# Patient Record
Sex: MALE | Race: WHITE | Employment: OTHER | URBAN - METROPOLITAN AREA
[De-identification: names, ages, dates, MRNs, and addresses within clinical notes are randomized per-mention and may not be internally consistent; named-entity substitution may affect disease eponyms.]

---

## 2017-09-25 ENCOUNTER — HOSPITAL ENCOUNTER (OUTPATIENT)
Dept: CT IMAGING | Age: 72
Discharge: HOME | End: 2017-09-25
Attending: INTERNAL MEDICINE
Payer: MEDICARE

## 2017-09-25 DIAGNOSIS — R06.6 HICCUPS: ICD-10-CM

## 2017-09-25 LAB — CREAT BLD-MCNC: 1.6 MG/DL (ref 0.8–1.5)

## 2017-09-25 PROCEDURE — 82565 ASSAY OF CREATININE: CPT

## 2017-09-25 RX ORDER — SODIUM CHLORIDE 0.9 % (FLUSH) 0.9 %
10 SYRINGE (ML) INJECTION
Status: ACTIVE | OUTPATIENT
Start: 2017-09-25 | End: 2017-09-25

## 2017-09-28 ENCOUNTER — HOSPITAL ENCOUNTER (OUTPATIENT)
Dept: LAB | Age: 72
Discharge: HOME | End: 2017-09-28

## 2017-09-28 PROCEDURE — 88305 TISSUE EXAM BY PATHOLOGIST: CPT | Performed by: INTERNAL MEDICINE

## 2017-10-12 ENCOUNTER — HOSPITAL ENCOUNTER (OUTPATIENT)
Dept: CT IMAGING | Age: 72
Discharge: HOME | End: 2017-10-12
Attending: INTERNAL MEDICINE
Payer: MEDICARE

## 2017-10-12 VITALS — BODY MASS INDEX: 27.16 KG/M2 | WEIGHT: 194 LBS | HEIGHT: 71 IN

## 2017-10-12 LAB — CREAT BLD-MCNC: 1.7 MG/DL (ref 0.8–1.5)

## 2017-10-12 PROCEDURE — 70491 CT SOFT TISSUE NECK W/DYE: CPT

## 2017-10-12 PROCEDURE — 74011000258 HC RX REV CODE- 258: Performed by: INTERNAL MEDICINE

## 2017-10-12 PROCEDURE — 82565 ASSAY OF CREATININE: CPT

## 2017-10-12 PROCEDURE — 74011636320 HC RX REV CODE- 636/320: Performed by: INTERNAL MEDICINE

## 2017-10-12 PROCEDURE — 74011250636 HC RX REV CODE- 250/636: Performed by: INTERNAL MEDICINE

## 2017-10-12 RX ORDER — SODIUM CHLORIDE 9 MG/ML
3 INJECTION, SOLUTION INTRAVENOUS ONCE
Status: COMPLETED | OUTPATIENT
Start: 2017-10-12 | End: 2017-10-12

## 2017-10-12 RX ORDER — SODIUM CHLORIDE 9 MG/ML
1 INJECTION, SOLUTION INTRAVENOUS AS NEEDED
Status: ACTIVE | OUTPATIENT
Start: 2017-10-12 | End: 2017-10-13

## 2017-10-12 RX ORDER — SODIUM CHLORIDE 0.9 % (FLUSH) 0.9 %
10 SYRINGE (ML) INJECTION
Status: COMPLETED | OUTPATIENT
Start: 2017-10-12 | End: 2017-10-12

## 2017-10-12 RX ADMIN — SODIUM CHLORIDE 3 ML/KG/HR: 900 INJECTION, SOLUTION INTRAVENOUS at 09:45

## 2017-10-12 RX ADMIN — SODIUM CHLORIDE 100 ML: 900 INJECTION, SOLUTION INTRAVENOUS at 12:52

## 2017-10-12 RX ADMIN — Medication 10 ML: at 12:52

## 2017-10-12 RX ADMIN — IOPAMIDOL 80 ML: 755 INJECTION, SOLUTION INTRAVENOUS at 12:52

## 2017-10-12 NOTE — PROGRESS NOTES
Patient tolerated IV fluids well. Ate lunch, appetite good. Ambulated several times to bathroom to void. Instructed to force oral fluids today and notify physician with any problems. Patient verbalizes understanding. PIV removed intact site slightly swollen, good venous returns. Patient instructed to apply warm compresses several times daily for a few days. Patient discharged ambulatory in stable condition. Patient to follow up with physician.

## 2019-02-25 ENCOUNTER — HOSPITAL ENCOUNTER (INPATIENT)
Age: 74
LOS: 1 days | Discharge: HOME | DRG: 313 | End: 2019-02-27
Attending: INTERNAL MEDICINE | Admitting: INTERNAL MEDICINE
Payer: MEDICARE

## 2019-02-25 PROBLEM — I10 HTN (HYPERTENSION): Status: ACTIVE | Noted: 2019-02-25

## 2019-02-25 PROBLEM — R07.9 CHEST PAIN: Status: ACTIVE | Noted: 2019-02-25

## 2019-02-25 LAB
CK SERPL-CCNC: 109 U/L (ref 21–215)
GLUCOSE BLD STRIP.AUTO-MCNC: 149 MG/DL (ref 65–100)
TROPONIN I SERPL-MCNC: <0.02 NG/ML (ref 0.02–0.05)

## 2019-02-25 PROCEDURE — 74011250636 HC RX REV CODE- 250/636: Performed by: FAMILY MEDICINE

## 2019-02-25 PROCEDURE — 84484 ASSAY OF TROPONIN QUANT: CPT

## 2019-02-25 PROCEDURE — 36415 COLL VENOUS BLD VENIPUNCTURE: CPT

## 2019-02-25 PROCEDURE — 93005 ELECTROCARDIOGRAM TRACING: CPT | Performed by: FAMILY MEDICINE

## 2019-02-25 PROCEDURE — 82550 ASSAY OF CK (CPK): CPT

## 2019-02-25 PROCEDURE — 82962 GLUCOSE BLOOD TEST: CPT

## 2019-02-25 PROCEDURE — 99218 HC RM OBSERVATION: CPT

## 2019-02-25 RX ORDER — CARVEDILOL 12.5 MG/1
TABLET ORAL 2 TIMES DAILY WITH MEALS
COMMUNITY

## 2019-02-25 RX ORDER — NITROGLYCERIN 0.4 MG/1
0.4 TABLET SUBLINGUAL
Status: DISCONTINUED | OUTPATIENT
Start: 2019-02-25 | End: 2019-02-27 | Stop reason: HOSPADM

## 2019-02-25 RX ORDER — ACETAMINOPHEN 325 MG/1
650 TABLET ORAL
Status: DISCONTINUED | OUTPATIENT
Start: 2019-02-25 | End: 2019-02-27 | Stop reason: HOSPADM

## 2019-02-25 RX ORDER — LEVOTHYROXINE SODIUM 25 UG/1
88 TABLET ORAL
COMMUNITY

## 2019-02-25 RX ORDER — SODIUM CHLORIDE 0.9 % (FLUSH) 0.9 %
5-40 SYRINGE (ML) INJECTION EVERY 8 HOURS
Status: DISCONTINUED | OUTPATIENT
Start: 2019-02-25 | End: 2019-02-27 | Stop reason: HOSPADM

## 2019-02-25 RX ORDER — SODIUM CHLORIDE 0.9 % (FLUSH) 0.9 %
5-40 SYRINGE (ML) INJECTION AS NEEDED
Status: DISCONTINUED | OUTPATIENT
Start: 2019-02-25 | End: 2019-02-27 | Stop reason: HOSPADM

## 2019-02-25 RX ORDER — ENOXAPARIN SODIUM 100 MG/ML
40 INJECTION SUBCUTANEOUS EVERY 24 HOURS
Status: DISCONTINUED | OUTPATIENT
Start: 2019-02-25 | End: 2019-02-27 | Stop reason: HOSPADM

## 2019-02-25 RX ORDER — RANITIDINE 300 MG/1
300 TABLET ORAL 2 TIMES DAILY
COMMUNITY
End: 2019-02-27

## 2019-02-25 RX ORDER — OXYCODONE AND ACETAMINOPHEN 10; 325 MG/1; MG/1
1 TABLET ORAL
Status: DISCONTINUED | OUTPATIENT
Start: 2019-02-25 | End: 2019-02-27 | Stop reason: HOSPADM

## 2019-02-25 RX ORDER — NALOXONE HYDROCHLORIDE 0.4 MG/ML
0.4 INJECTION, SOLUTION INTRAMUSCULAR; INTRAVENOUS; SUBCUTANEOUS AS NEEDED
Status: DISCONTINUED | OUTPATIENT
Start: 2019-02-25 | End: 2019-02-27 | Stop reason: HOSPADM

## 2019-02-25 RX ORDER — MORPHINE SULFATE 2 MG/ML
2 INJECTION, SOLUTION INTRAMUSCULAR; INTRAVENOUS
Status: DISCONTINUED | OUTPATIENT
Start: 2019-02-25 | End: 2019-02-27 | Stop reason: HOSPADM

## 2019-02-25 RX ORDER — GUAIFENESIN 100 MG/5ML
81 LIQUID (ML) ORAL DAILY
Status: DISCONTINUED | OUTPATIENT
Start: 2019-02-26 | End: 2019-02-27 | Stop reason: HOSPADM

## 2019-02-25 RX ADMIN — ENOXAPARIN SODIUM 40 MG: 40 INJECTION SUBCUTANEOUS at 21:41

## 2019-02-25 RX ADMIN — Medication 10 ML: at 21:46

## 2019-02-26 ENCOUNTER — APPOINTMENT (OUTPATIENT)
Dept: GENERAL RADIOLOGY | Age: 74
DRG: 313 | End: 2019-02-26
Attending: NURSE PRACTITIONER
Payer: MEDICARE

## 2019-02-26 PROBLEM — I10 HTN (HYPERTENSION): Chronic | Status: ACTIVE | Noted: 2019-02-25

## 2019-02-26 PROBLEM — E11.9 CONTROLLED TYPE 2 DIABETES MELLITUS WITHOUT COMPLICATION, WITHOUT LONG-TERM CURRENT USE OF INSULIN (HCC): Chronic | Status: ACTIVE | Noted: 2019-02-26

## 2019-02-26 PROBLEM — K22.4 ESOPHAGEAL DYSMOTILITY: Status: ACTIVE | Noted: 2019-02-26

## 2019-02-26 LAB
ALBUMIN SERPL-MCNC: 3.1 G/DL (ref 3.2–4.6)
ALBUMIN/GLOB SERPL: 1.1 {RATIO} (ref 1.2–3.5)
ALP SERPL-CCNC: 90 U/L (ref 50–136)
ALT SERPL-CCNC: 21 U/L (ref 12–65)
ANION GAP SERPL CALC-SCNC: 9 MMOL/L (ref 7–16)
AST SERPL-CCNC: 18 U/L (ref 15–37)
ATRIAL RATE: 65 BPM
BASOPHILS # BLD: 0.1 K/UL (ref 0–0.2)
BASOPHILS NFR BLD: 1 % (ref 0–2)
BILIRUB SERPL-MCNC: 0.4 MG/DL (ref 0.2–1.1)
BUN SERPL-MCNC: 15 MG/DL (ref 8–23)
CALCIUM SERPL-MCNC: 8.4 MG/DL (ref 8.3–10.4)
CALCULATED P AXIS, ECG09: 84 DEGREES
CALCULATED R AXIS, ECG10: -79 DEGREES
CALCULATED T AXIS, ECG11: 59 DEGREES
CHLORIDE SERPL-SCNC: 107 MMOL/L (ref 98–107)
CHOLEST SERPL-MCNC: 134 MG/DL
CO2 SERPL-SCNC: 26 MMOL/L (ref 21–32)
CREAT SERPL-MCNC: 1.18 MG/DL (ref 0.8–1.5)
DIAGNOSIS, 93000: NORMAL
DIFFERENTIAL METHOD BLD: ABNORMAL
EOSINOPHIL # BLD: 0.2 K/UL (ref 0–0.8)
EOSINOPHIL NFR BLD: 2 % (ref 0.5–7.8)
ERYTHROCYTE [DISTWIDTH] IN BLOOD BY AUTOMATED COUNT: 13.8 % (ref 11.9–14.6)
GLOBULIN SER CALC-MCNC: 2.9 G/DL (ref 2.3–3.5)
GLUCOSE BLD STRIP.AUTO-MCNC: 105 MG/DL (ref 65–100)
GLUCOSE SERPL-MCNC: 116 MG/DL (ref 65–100)
HCT VFR BLD AUTO: 40.6 % (ref 41.1–50.3)
HDLC SERPL-MCNC: 40 MG/DL (ref 40–60)
HDLC SERPL: 3.4 {RATIO}
HGB BLD-MCNC: 13 G/DL (ref 13.6–17.2)
IMM GRANULOCYTES # BLD AUTO: 0 K/UL (ref 0–0.5)
IMM GRANULOCYTES NFR BLD AUTO: 0 % (ref 0–5)
LDLC SERPL CALC-MCNC: 69.4 MG/DL
LIPID PROFILE,FLP: ABNORMAL
LYMPHOCYTES # BLD: 2.2 K/UL (ref 0.5–4.6)
LYMPHOCYTES NFR BLD: 33 % (ref 13–44)
MCH RBC QN AUTO: 29.1 PG (ref 26.1–32.9)
MCHC RBC AUTO-ENTMCNC: 32 G/DL (ref 31.4–35)
MCV RBC AUTO: 91 FL (ref 79.6–97.8)
MONOCYTES # BLD: 0.7 K/UL (ref 0.1–1.3)
MONOCYTES NFR BLD: 11 % (ref 4–12)
NEUTS SEG # BLD: 3.5 K/UL (ref 1.7–8.2)
NEUTS SEG NFR BLD: 53 % (ref 43–78)
NRBC # BLD: 0 K/UL (ref 0–0.2)
P-R INTERVAL, ECG05: 158 MS
PLATELET # BLD AUTO: 139 K/UL (ref 150–450)
PMV BLD AUTO: 12.1 FL (ref 9.4–12.3)
POTASSIUM SERPL-SCNC: 3.4 MMOL/L (ref 3.5–5.1)
PROT SERPL-MCNC: 6 G/DL (ref 6.3–8.2)
Q-T INTERVAL, ECG07: 448 MS
QRS DURATION, ECG06: 164 MS
QTC CALCULATION (BEZET), ECG08: 465 MS
RBC # BLD AUTO: 4.46 M/UL (ref 4.23–5.6)
SODIUM SERPL-SCNC: 142 MMOL/L (ref 136–145)
TRIGL SERPL-MCNC: 123 MG/DL (ref 35–150)
TROPONIN I SERPL-MCNC: <0.02 NG/ML (ref 0.02–0.05)
VENTRICULAR RATE, ECG03: 65 BPM
VLDLC SERPL CALC-MCNC: 24.6 MG/DL (ref 6–23)
WBC # BLD AUTO: 6.6 K/UL (ref 4.3–11.1)

## 2019-02-26 PROCEDURE — 74011250636 HC RX REV CODE- 250/636: Performed by: INTERNAL MEDICINE

## 2019-02-26 PROCEDURE — 36415 COLL VENOUS BLD VENIPUNCTURE: CPT

## 2019-02-26 PROCEDURE — 74011250637 HC RX REV CODE- 250/637: Performed by: INTERNAL MEDICINE

## 2019-02-26 PROCEDURE — 80061 LIPID PANEL: CPT

## 2019-02-26 PROCEDURE — 84484 ASSAY OF TROPONIN QUANT: CPT

## 2019-02-26 PROCEDURE — C8929 TTE W OR WO FOL WCON,DOPPLER: HCPCS

## 2019-02-26 PROCEDURE — 80053 COMPREHEN METABOLIC PANEL: CPT

## 2019-02-26 PROCEDURE — 74011250636 HC RX REV CODE- 250/636: Performed by: FAMILY MEDICINE

## 2019-02-26 PROCEDURE — 74011250636 HC RX REV CODE- 250/636: Performed by: PHYSICIAN ASSISTANT

## 2019-02-26 PROCEDURE — 99218 HC RM OBSERVATION: CPT

## 2019-02-26 PROCEDURE — C9113 INJ PANTOPRAZOLE SODIUM, VIA: HCPCS | Performed by: PHYSICIAN ASSISTANT

## 2019-02-26 PROCEDURE — 85025 COMPLETE CBC W/AUTO DIFF WBC: CPT

## 2019-02-26 PROCEDURE — 74011250637 HC RX REV CODE- 250/637: Performed by: FAMILY MEDICINE

## 2019-02-26 PROCEDURE — 74011000250 HC RX REV CODE- 250: Performed by: PHYSICIAN ASSISTANT

## 2019-02-26 PROCEDURE — 82962 GLUCOSE BLOOD TEST: CPT

## 2019-02-26 PROCEDURE — 74011000250 HC RX REV CODE- 250: Performed by: INTERNAL MEDICINE

## 2019-02-26 RX ORDER — METOPROLOL TARTRATE 25 MG/1
25 TABLET, FILM COATED ORAL 2 TIMES DAILY
Status: DISCONTINUED | OUTPATIENT
Start: 2019-02-26 | End: 2019-02-27

## 2019-02-26 RX ORDER — METFORMIN HYDROCHLORIDE 500 MG/1
500 TABLET ORAL
COMMUNITY

## 2019-02-26 RX ADMIN — NITROGLYCERIN 0.4 MG: 0.4 TABLET, ORALLY DISINTEGRATING SUBLINGUAL at 08:57

## 2019-02-26 RX ADMIN — NITROGLYCERIN 0.4 MG: 0.4 TABLET, ORALLY DISINTEGRATING SUBLINGUAL at 08:50

## 2019-02-26 RX ADMIN — NITROGLYCERIN 1 INCH: 20 OINTMENT TOPICAL at 00:19

## 2019-02-26 RX ADMIN — ASPIRIN 81 MG 81 MG: 81 TABLET ORAL at 08:49

## 2019-02-26 RX ADMIN — SODIUM CHLORIDE 40 MG: 9 INJECTION, SOLUTION INTRAMUSCULAR; INTRAVENOUS; SUBCUTANEOUS at 10:01

## 2019-02-26 RX ADMIN — Medication 20 ML: at 21:35

## 2019-02-26 RX ADMIN — PERFLUTREN 1 ML: 6.52 INJECTION, SUSPENSION INTRAVENOUS at 14:00

## 2019-02-26 RX ADMIN — ENOXAPARIN SODIUM 40 MG: 40 INJECTION SUBCUTANEOUS at 21:31

## 2019-02-26 RX ADMIN — NITROGLYCERIN 1 INCH: 20 OINTMENT TOPICAL at 05:46

## 2019-02-26 RX ADMIN — METOPROLOL TARTRATE 25 MG: 25 TABLET ORAL at 17:22

## 2019-02-26 RX ADMIN — Medication 10 ML: at 05:47

## 2019-02-26 RX ADMIN — HYDROCHLOROTHIAZIDE: 12.5 CAPSULE ORAL at 00:19

## 2019-02-26 RX ADMIN — METOPROLOL TARTRATE 25 MG: 25 TABLET ORAL at 10:01

## 2019-02-26 RX ADMIN — SODIUM CHLORIDE 40 MG: 9 INJECTION, SOLUTION INTRAMUSCULAR; INTRAVENOUS; SUBCUTANEOUS at 21:31

## 2019-02-26 NOTE — PROGRESS NOTES
Unable to complete exam on 2/26/17 due to transport being extremely delayed. Sent for patient at 12:00pm and by [id number] the patient still had not been picked up and radiologist suggested postponing for the next morning. Tech communicated with nurse.

## 2019-02-26 NOTE — H&P
HOSPITALIST H&PNAME:  Vaishnavi Marker Age:  68 y.o. 
:   1945 MRN:   [id number] PCP: Robin Carvalho MD 
Treatment Team: Attending Provider: Jabier Castleman, DO No Order CC: Reason for admission is: chest pain HPI:  
Patient history was obtained from the urgent care provider prior to seeing the patient. Patient is a 68 y.o. male who is directly admitted to us from Urgent care facility. He went there due to acute onset of chest pain and significant HTN. reprts the pain as a pressure sensation, started out as a 8/10, now about a 4/10. He has a h/o achalasia and had his esophagus stretched a couple weeks ago. He states that he has done well with that and is off of pain meds. He reports that this pain is very different than prior esophageal pains. Denies SOB, diaphoresis, radiation of pain, fever/chills, cough, n/v, or syncopal feelings. He did check his BP at home and it was very high. He has had recent changes in his BP meds within the last few weeks, but it has never been this high before. He reports a stress test done 3-4 years ago and negative. Denies any cardiac diagnosis. His pain improved after NTG, but did not resolve completely. Pt also notes that he recently completed a fairly vigorous walk, up hills and brisk pace; did not have any symptoms, was not even winded. ROS: 
All systems have been reviewed and are negative except as stated in HPI or elsewhere. No past medical history on file. No past surgical history on file. Social History Tobacco Use  Smoking status: Not on file Substance Use Topics  Alcohol use: Not on file No family history on file. FH Reviewed and non-contributory to admitting diagnosis No Known Allergies None Objective: No intake or output data in the 24 hours ending 19 Temp (24hrs), Av.3 °F (36.8 °C), Min:98.3 °F (36.8 °C), Max:98.3 °F (36.8 °C) Oxygen Therapy O2 Sat (%): 97 % (02/25/19 2020) Body mass index is 25.31 kg/m². Patient Vitals for the past 24 hrs: 
 Temp Pulse Resp BP SpO2  
02/25/19 2020 98.3 °F (36.8 °C) 65 18 (!) 146/96 97 % Physical Exam: 
 
General:    WD and WN, No apparent distress. Head:   Normocephalic, without obvious abnormality, atraumatic. Eyes:  PERRL; EOMI; sclera normal/non-icteric ENT:  Hearing is normal.  Oropharynx is clear with tacky mucous membranes Resp:    Clear to auscultation bilaterally. No Wheezing or Rhonchi. Resp are even and unlabored Heart[de-identified]  Regular rate and rhythm,  no murmur,   No LE edema Abdomen:   Soft, non-tender. Not distended. Bowel sounds normal.  hepato-splenomegaly -none Musc/SK: Muscle strength is good and tone normal; No cyanosis. No clubbing Skin:     Texture, turgor normal. No significant rashes or lesions. Capillary refill < 2 sec Neurologic: CN II - XII are grossly intact - Eye exam as noted above Psych: Alert and oriented x 4;  Judgement and insight are normal 
  
Data Review: No results found for this or any previous visit (from the past 24 hour(s)). CXR Results  (Last 48 hours) None CT Results  (Last 48 hours) None Assessment and Plan: Active Hospital Problems Diagnosis Date Noted  Chest pain 02/25/2019  
 HTN (hypertension) 02/25/2019 Principal Problem: 
  Chest pain (2/25/2019) Serial trop; echo in am; cardiac consult to [facility] for stress test; repeat EKG Active Problems: 
  HTN (hypertension) (2/25/2019) Adjust meds; resume ACE inhibitor as pt is DM Diabetes Hold metformin; SSI · PLAN General 
·  
· Cont appropriate home meds (see MAR) · Control symptoms (pain, n/v, fever, etc) · Monitor appropriate labs · DVT prophylaxis:  Lovenox · Code status: Full;  HCPOA:  
· Risk: high · Anticipated DC needs: · Estimated LOS:  less than 2 midnights · Plans discussed with patient and/or caregiver; questions answered. Med records reviewed if applicable; findings:  
 
Critical care time if applicable:   
 
Signed By: Shaun Del Toro MD   
 February 25, 2019

## 2019-02-26 NOTE — PROGRESS NOTES
Problem: Falls - Risk of 
Goal: *Absence of Falls Document [facility] [place] Fall Risk and appropriate interventions in the flowsheet. Outcome: Progressing Towards Goal 
Fall Risk Interventions:

## 2019-02-26 NOTE — PROGRESS NOTES
Hospitalist Progress Note Admit Date:  2019  8:09 PM  
Name:  Fernando Marcum Age:  68 y.o. 
:  1945 MRN:  [id number] PCP:  Colin, Not On File Treatment Team: Attending Provider: Farrukh Rebolledo MD; Consulting Provider: Alyssa Del Cid MD; Utilization Review: Sis Casey RN; Consulting Provider: Dave Wetzel MD; Care Manager: Cami Sprague HPI/Subjective:  
69 y/o male with h/o DM, HTN and achalasia (with recent esophageal dilation) admitted  with chest pain. : He is feeling better today, but says he's had the chest pain constantly since yesterday afternoon, now currently a 3/10. It does get better with nitro but he has no SOB/JEFFERSON, palpitations, diaphoresis. Objective:  
 
Patient Vitals for the past 24 hrs: 
 Temp Pulse Resp BP SpO2  
19 0857  68  134/73   
19 0850    144/86   
19 0849 97.5 °F (36.4 °C)  18  96 % 19 0500 97.8 °F (36.6 °C) 64 18 123/77 97 % 19 0124 97.8 °F (36.6 °C) 76 18 127/70 95 % 19 2020 98.3 °F (36.8 °C) 65 18 (!) 146/96 97 % Oxygen Therapy O2 Sat (%): 96 % (19 0849) O2 Device: Room air (19 0720) No intake or output data in the 24 hours ending 19 1045 *Note that automatically entered I/Os may not be accurate; dependent on patient compliance with collection and accurate  by [employer]. General:    Well nourished. Alert. CV:   RRR. No murmur, rub, or gallop. Lungs:   CTAB. No wheezing, rhonchi, or rales. Abdomen:   Soft, nontender, nondistended. Extremities: Warm and dry. No cyanosis or edema. Skin:     No rashes or jaundice. Neuro:  No gross focal deficits Data Review: 
I have reviewed all labs, meds, and studies from the last 24 hours: 
 
Recent Results (from the past 24 hour(s)) GLUCOSE, POC Collection Time: 19  9:20 PM  
Result Value Ref Range Glucose (POC) 149 (H) 65 - 100 mg/dL CK  
 Collection Time: 02/25/19  9:59 PM  
Result Value Ref Range  21 - 215 U/L  
TROPONIN I Collection Time: 02/25/19  9:59 PM  
Result Value Ref Range Troponin-I, Qt. <0.02 (L) 0.02 - 0.05 NG/ML  
EKG, 12 LEAD, INITIAL Collection Time: 02/25/19 10:29 PM  
Result Value Ref Range Ventricular Rate 65 BPM  
 Atrial Rate 65 BPM  
 P-R Interval 158 ms QRS Duration 164 ms Q-T Interval 448 ms QTC Calculation (Bezet) 465 ms Calculated P Axis 84 degrees Calculated R Axis -79 degrees Calculated T Axis 59 degrees Diagnosis Normal sinus rhythm with sinus arrhythmia Right bundle branch block Left anterior fascicular block 
!!! Bifascicular block !!! 
Voltage criteria for left ventricular hypertrophy Cannot rule out Septal infarct , age undetermined Abnormal ECG No previous ECGs available Confirmed by MAXX STUART (), Nicky Tellez ([id number]) on 2/26/2019 7:39:06 AM 
  
LIPID PANEL Collection Time: 02/26/19  3:18 AM  
Result Value Ref Range LIPID PROFILE Cholesterol, total 134 <200 MG/DL Triglyceride 123 35 - 150 MG/DL  
 HDL Cholesterol 40 40 - 60 MG/DL  
 LDL, calculated 69.4 <100 MG/DL VLDL, calculated 24.6 (H) 6.0 - 23.0 MG/DL  
 CHOL/HDL Ratio 3.4    
CBC WITH AUTOMATED DIFF Collection Time: 02/26/19  3:18 AM  
Result Value Ref Range WBC 6.6 4.3 - 11.1 K/uL  
 RBC 4.46 4.23 - 5.6 M/uL  
 HGB 13.0 (L) 13.6 - 17.2 g/dL HCT 40.6 (L) 41.1 - 50.3 % MCV 91.0 79.6 - 97.8 FL  
 MCH 29.1 26.1 - 32.9 PG  
 MCHC 32.0 31.4 - 35.0 g/dL  
 RDW 13.8 11.9 - 14.6 % PLATELET 656 (L) 709 - 450 K/uL MPV 12.1 9.4 - 12.3 FL ABSOLUTE NRBC 0.00 0.0 - 0.2 K/uL  
 DF AUTOMATED NEUTROPHILS 53 43 - 78 % LYMPHOCYTES 33 13 - 44 % MONOCYTES 11 4.0 - 12.0 % EOSINOPHILS 2 0.5 - 7.8 % BASOPHILS 1 0.0 - 2.0 % IMMATURE GRANULOCYTES 0 0.0 - 5.0 %  
 ABS. NEUTROPHILS 3.5 1.7 - 8.2 K/UL  
 ABS. LYMPHOCYTES 2.2 0.5 - 4.6 K/UL  
 ABS. MONOCYTES 0.7 0.1 - 1.3 K/UL ABS. EOSINOPHILS 0.2 0.0 - 0.8 K/UL  
 ABS. BASOPHILS 0.1 0.0 - 0.2 K/UL  
 ABS. IMM. GRANS. 0.0 0.0 - 0.5 K/UL METABOLIC PANEL, COMPREHENSIVE Collection Time: 02/26/19  3:18 AM  
Result Value Ref Range Sodium 142 136 - 145 mmol/L Potassium 3.4 (L) 3.5 - 5.1 mmol/L Chloride 107 98 - 107 mmol/L  
 CO2 26 21 - 32 mmol/L Anion gap 9 7 - 16 mmol/L Glucose 116 (H) 65 - 100 mg/dL BUN 15 8 - 23 MG/DL Creatinine 1.18 0.8 - 1.5 MG/DL  
 GFR est AA >60 >60 ml/min/1.73m2 GFR est non-AA >60 >60 ml/min/1.73m2 Calcium 8.4 8.3 - 10.4 MG/DL Bilirubin, total 0.4 0.2 - 1.1 MG/DL  
 ALT (SGPT) 21 12 - 65 U/L  
 AST (SGOT) 18 15 - 37 U/L Alk. phosphatase 90 50 - 136 U/L Protein, total 6.0 (L) 6.3 - 8.2 g/dL Albumin 3.1 (L) 3.2 - 4.6 g/dL Globulin 2.9 2.3 - 3.5 g/dL A-G Ratio 1.1 (L) 1.2 - 3.5 All Micro Results None No results found for this visit on 02/25/19. Current Meds: 
Current Facility-Administered Medications Medication Dose Route Frequency  pantoprazole (PROTONIX) 40 mg in sodium chloride 0.9% 10 mL injection  40 mg IntraVENous Q12H  
 metoprolol tartrate (LOPRESSOR) tablet 25 mg  25 mg Oral BID  sodium chloride (NS) flush 5-40 mL  5-40 mL IntraVENous Q8H  
 sodium chloride (NS) flush 5-40 mL  5-40 mL IntraVENous PRN  
 aspirin chewable tablet 81 mg  81 mg Oral DAILY  nitroglycerin (NITROBID) 2 % ointment 1 Inch  1 Inch Topical Q6H  
 nitroglycerin (NITROSTAT) tablet 0.4 mg  0.4 mg SubLINGual Q5MIN PRN  
 morphine injection 2 mg  2 mg IntraVENous Q4H PRN  
 acetaminophen (TYLENOL) tablet 650 mg  650 mg Oral Q4H PRN  
 oxyCODONE-acetaminophen (PERCOCET 10)  mg per tablet 1 Tab  1 Tab Oral Q4H PRN  
 naloxone (NARCAN) injection 0.4 mg  0.4 mg IntraVENous PRN  
 enoxaparin (LOVENOX) injection 40 mg  40 mg SubCUTAneous Q24H  
 benazepril/hydroCHLOROthiazide (LOTENSIN HCT) 20/12.5 mg   Oral DAILY Other Studies (last 24 hours): No results found. Assessment and Plan:  
 
Hospital Problems as of 2/26/2019 Never Reviewed Codes Class Noted - Resolved POA Controlled type 2 diabetes mellitus without complication, without long-term current use of insulin (HCC) (Chronic) ICD-10-CM: E11.9 ICD-9-CM: 250.00  2/26/2019 - Present Yes Esophageal dysmotility ICD-10-CM: K22.4 ICD-9-CM: 530.5  2/26/2019 - Present Unknown * (Principal) Chest pain ICD-10-CM: R07.9 ICD-9-CM: 786.50  2/25/2019 - Present Yes HTN (hypertension) (Chronic) ICD-10-CM: I10 
ICD-9-CM: 401.9  2/25/2019 - Present Yes Plan: # Atypical chest pain - Appreciate cardiology. Initial trop neg, f/u today pending 
 - TTE pending.  
 - Cardiology to pursue stress testing if GI eval neg # Esophageal dysmotility 
 - Dilated last Thursday. Prior dilation resulted in a \"tear\", but does not sound like he had any perforation. ? Barium swallow. GI pending. # HTN 
 - controlled. Home meds. # DM 
 - metformin held. DC planning/Dispo:  As above. GI w/u, poss stress test.  
Diet:  DIET CARDIAC 
DVT ppx: lovenox Signed: 
Estrada Pete MD

## 2019-02-26 NOTE — PROGRESS NOTES
Verbal bedside report given to sade Monge RN. Patient's situation, background, assessment and recommendations provided. Opportunity for questions provided. Oncoming RN assumed care of patient.

## 2019-02-26 NOTE — CONSULTS
[facility] Cardiology Consult                Date of  Admission: 2/25/2019  8:09 PM     Primary Care Physician: Dr Villa Dupree  Primary Cardiologist: Dr Ame Bustamante   Referring Physician: Dr Marcia Hall Physician: Dr Ame Bustamante    CC/Reason for consult: CP       Bogdan Saeed is a 68 y.o. male admitted for Chest pain [R07.9]  HTN (hypertension) [I10]. He has a h/o esophageal dysmotility s/p dilation 2-21-19 at [facility], htn and DM. He had a nuclear stress test 9-2015 which was unremarkable per pt. No f/h of CAD, smoked 1 ppd x 20 years, quit 1990. He has had mild esophogeal pain s/p dilation, different from pain he began having yesterday after walking 3 miles up hills without difficulty. He was at home after walk and resting when he had sudden onset of 8/10 pressure in his L chest, radiating around to under his R arm to his back. No nausea, dizziness, palpitations,  diaphoresis or dyspnea. His SBP was elevated around 207. His BP had been elevated a few weeks ago at visit w PCP and his benazepril had been changed to coreg. Pain continued, constant, not changed with activity or position, did not try anything to help with pain, and he went to  where he was given ASA and nitro. Pain did not initially improve, but gradually decreased to 4/10 and has been constant since. Trop negative, EKG NSR w rate 65 w R BBB (old per care everywhere records), WBC 6.6, hgb 13, K 3.4, cr 1.18, LDL 69, trig 123. Pain continues 4/10. Coreg stopped and lotensin added, BP this /77. No h/o DVT or PE, no recent injury. Patient Active Problem List   Diagnosis Code    Chest pain R07.9    HTN (hypertension) I10    Controlled type 2 diabetes mellitus without complication, without long-term current use of insulin (HCC) E11.9       PMH: DM, htn     No past surgical history on file. No Known Allergies   No family history on file.    Social History     Tobacco Use    Smoking status: Not on file   Substance Use Topics    Alcohol use: Not on file        Current Facility-Administered Medications   Medication Dose Route Frequency    sodium chloride (NS) flush 5-40 mL  5-40 mL IntraVENous Q8H    sodium chloride (NS) flush 5-40 mL  5-40 mL IntraVENous PRN    aspirin chewable tablet 81 mg  81 mg Oral DAILY    nitroglycerin (NITROBID) 2 % ointment 1 Inch  1 Inch Topical Q6H    nitroglycerin (NITROSTAT) tablet 0.4 mg  0.4 mg SubLINGual Q5MIN PRN    morphine injection 2 mg  2 mg IntraVENous Q4H PRN    acetaminophen (TYLENOL) tablet 650 mg  650 mg Oral Q4H PRN    oxyCODONE-acetaminophen (PERCOCET 10)  mg per tablet 1 Tab  1 Tab Oral Q4H PRN    naloxone (NARCAN) injection 0.4 mg  0.4 mg IntraVENous PRN    enoxaparin (LOVENOX) injection 40 mg  40 mg SubCUTAneous Q24H    benazepril/hydroCHLOROthiazide (LOTENSIN HCT) 20/12.5 mg   Oral DAILY       Review of Symptoms:  General: + mild weight loss due to dysphagia,  no weakness, fever or chills  Skin: no rashes, lumps, or other skin changes  HEENT: no headache, dizziness, lightheadedness, vision changes, hearing changes, tinnitus, vertigo, sinus pressure/pain, bleeding gums, sore throat, or hoarseness  Neck: no swollen glands, goiter, pain or stiffness  Respiratory: no cough, sputum, hemoptysis, no dyspnea, wheezing  Cardiovascular: + as per HPI  Gastrointestinal: + dysphagia s/p dilation   Urinary: no frequency, urgency , hematuria, burning/pain with urination, recent flank pain, polyuria, nocturia, or difficulty urinating  Peripheral Vascular: no claudication, leg cramps, prior DVTs, swelling of calves, legs, or feet, color change, or swelling with redness or tenderness  Musculoskeletal: no muscle or joint pain/stiffness, joint swelling, erythema of joints, or back pain  Psychiatric: no depression or excessive stress  Neurological: no sensory or motor loss, seizures, syncope, tremors, numbness, no dementia  Hematologic: no anemia, easy bruising or bleeding  Endocrine: + thyroid problems on synthroid, heat or cold intolerance, excessive sweating, polyuria, polydipsia, + diabetes. Physical Exam  Vitals:    02/25/19 2020 02/26/19 0124 02/26/19 0500   BP: (!) 146/96 127/70 123/77   Pulse: 65 76 64   Resp: 18 18 18   Temp: 98.3 °F (36.8 °C) 97.8 °F (36.6 °C) 97.8 °F (36.6 °C)   SpO2: 97% 95% 97%   Weight: 82.3 kg (181 lb 8 oz)  82.1 kg (181 lb)   Height: 5' 11\" (1.803 m)         Physical Exam:  General: Well Developed, Well Nourished, No Acute Distress, appears younger than his stated age   [de-identified]: pupils equal and round, no abnormalities noted  Neck: supple, no JVD, no carotid bruits  Heart: S1S2 with RRR without murmurs or gallops  Lungs: Clear throughout auscultation bilaterally without adventitious sounds  Abd: soft, mild epigastric tenderness without rebound but guarding, says that is the 4/10 pain he continues to have   Ext: warm, no edema  Skin: warm and dry  Psychiatric: Normal mood and affect  Neurologic: Alert and oriented X 3      Cardiographics    Telemetry: NSR w rate 60-70      Labs:   Recent Labs     02/26/19  0318      K 3.4*   BUN 15   CREA 1.18   *   WBC 6.6   HGB 13.0*   HCT 40.6*   *   TRIGL 123   HDL 40        Assessment/Plan:     Assessment:   Chest pain (2/25/2019)- Unclear etiology, may be related to dilation a week ago at [facility], as atypical for angina, first troponin negative, will check a second today and echo. GI consult, if cleared by GI will consider nuke in AM if second trop negative.   Cont ASA, add PPI    HTN (hypertension) (2/25/2019)- Well controlled now on lotensin, elevated pressure may have been related to pain     Controlled type 2 diabetes mellitus without complication, without long-term current use of insulin ([facility] [address] (2/26/2019)- Per primary     Esophageal dysmotility (2/26/2019)- s/p dilation 2-21-19, add PPI      Thank you very much for this referral. We appreciate the opportunity to participate in this patient's care. We will follow along with above stated plan.     Wing Yael PA-C  Consulting MD: Rahel Waller

## 2019-02-26 NOTE — PROGRESS NOTES
[facility] Consult Note Primary GI Physician: Dr. Galileo Garcia and Dr. Ismael Centeno at [facility] 
 
Referring Provider: QUITA Irene Consult Date:  2/26/2019 Admit Date:  2/25/2019 Chief Complaint: Atypical chest pain; epigastric abdominal pain Subjective:  
 
History of Present Illness:  Patient is a 68 y.o. male with PMH of (but not limited to) DM, HTN, achalasia followed by Dr. Ismael Centeno at [facility] who is seen in consultation at the request of QUITA Barba for atypical chest pain and epigastric abdominal pain. Mr. Ananya Howard has a history of achalasia diagnosed 5 years ago at [address]. He has most recently been followed by Dr. Ismael Centeno at [facility]. He underwent pneumatic dilation last month and again most recently on Thursday 2/21/19. He had some chest pain initially, but this resolved with pain medication and he was discharged back home. He felt well yesterday and did a 3 mile walk up and down hills in [facility]. He developed epigastric abdominal pain radiating into his back following this about 3 hours later. He has had some relief with NTG. He denies any N&V, melena. He has intermittent GERD which he takes Zantac PRN for. He has dysphagia with regurgitation, chronic hiccups and weight loss with his achalasia. He is supposed to contact Dr. Dustin Matamoros office in 2 weeks to determine if he has had any relief with most recent dilation and to determine next plan of care. He denies any NSAIDS, tobacco or ETOH. He is being followed by cardiology. His Troponin is negative <0.02 on 2/26. GILBERTO is scheduled. 2/26/19 Labs: WBC 6.6, hgb 13, Hct 40.6, MCV 91, RDW 13.8, plt 139, Sodium 142, potassium 3.4, glucose 116, BUN 15, creat 1.18, albumin 3.1, T bili 0.4, ALT 21, AST 18, AP 90, Tri 123 
 
9/28/17 Colonoscopy by Dr. Galileo Garcia with fair prep; diverticular disease, 4-5mm polyp. Pathology revealed a tubular adenoma. Recommend repeat in 3 years with 2 day prep 9/28/17 EGD by Dr. Karina Arthur revealed ulcerative esophagitis, 5mm ulcer proximal of EG junction, ? Short segment Vincent's esophagus, but negative pathology. PMH: 
DM 
HTN Achalasia PSH: 
No past surgical history on file. Allergies: 
No Known Allergies Home Medications: 
Prior to Admission medications Medication Sig Start Date End Date Taking? Authorizing Provider  
metFORMIN (GLUCOPHAGE) 500 mg tablet Take 500 mg by mouth daily (with breakfast). Yes Provider, Historical  
carvedilol (COREG) 12.5 mg tablet Take  by mouth two (2) times daily (with meals). Yes Provider, Historical  
raNITIdine (ZANTAC) 300 mg tab Take 300 mg by mouth two (2) times a day. Yes Provider, Historical  
levothyroxine (SYNTHROID) 25 mcg tablet Take 25 mcg by mouth Daily (before breakfast). Yes Provider, Historical  
 
 
Hospital Medications: 
Current Facility-Administered Medications Medication Dose Route Frequency  pantoprazole (PROTONIX) 40 mg in sodium chloride 0.9% 10 mL injection  40 mg IntraVENous Q12H  
 metoprolol tartrate (LOPRESSOR) tablet 25 mg  25 mg Oral BID  sodium chloride (NS) flush 5-40 mL  5-40 mL IntraVENous Q8H  
 sodium chloride (NS) flush 5-40 mL  5-40 mL IntraVENous PRN  
 aspirin chewable tablet 81 mg  81 mg Oral DAILY  nitroglycerin (NITROBID) 2 % ointment 1 Inch  1 Inch Topical Q6H  
 nitroglycerin (NITROSTAT) tablet 0.4 mg  0.4 mg SubLINGual Q5MIN PRN  
 morphine injection 2 mg  2 mg IntraVENous Q4H PRN  
 acetaminophen (TYLENOL) tablet 650 mg  650 mg Oral Q4H PRN  
 oxyCODONE-acetaminophen (PERCOCET 10)  mg per tablet 1 Tab  1 Tab Oral Q4H PRN  
 naloxone (NARCAN) injection 0.4 mg  0.4 mg IntraVENous PRN  
 enoxaparin (LOVENOX) injection 40 mg  40 mg SubCUTAneous Q24H  
 benazepril/hydroCHLOROthiazide (LOTENSIN HCT) 20/12.5 mg   Oral DAILY Social History: 
Social History Tobacco Use  Smoking status: Former Substance Use Topics  Alcohol use: Former Family History: No family history on file. Review of Systems: A detailed 10 system ROS is obtained, with pertinent positives as listed above. All others are negative. Diet:  NPO Objective:  
 
Physical Exam: 
Vitals: 
Visit Vitals /73 Pulse 68 Temp 97.5 °F (36.4 °C) Resp 18 Ht 5' 11\" (1.803 m) Wt 82.1 kg (181 lb) SpO2 96% BMI 25.24 kg/m² Gen:  Pt is alert, cooperative, no acute distress Skin:  Extremities and face reveal no rashes. HEENT: Sclerae anicteric. Extra-occular muscles are intact. No oral ulcers. No abnormal pigmentation of the lips. The neck is supple. Cardiovascular: Regular rate and rhythm. No murmurs, gallops, or rubs. Respiratory:  Comfortable breathing with no accessory muscle use. Clear breath sounds anteriorly with no wheezes, rales, or rhonchi. GI:  Abdomen nondistended, soft, and MILD TENDERNESS EPIGASTRUM  Normal active bowel sounds. No enlargement of the liver or spleen. No masses palpable. Rectal:  Deferred Musculoskeletal:  No pitting edema of the lower legs. Neurological:  Gross memory appears intact. Patient is alert and oriented. Psychiatric:  Mood appears appropriate with judgement intact. Lymphatic:  No cervical or supraclavicular adenopathy. Laboratory:   
Recent Labs  
  02/26/19 
4273 WBC 6.6 HGB 13.0*  
HCT 40.6* * MCV 91.0   
K 3.4*  
 CO2 26 BUN 15  
CREA 1.18  
CA 8.4 * AP 90 SGOT 18 ALT 21  
TBILI 0.4 ALB 3.1* TP 6.0* Assessment:  
 
Principal Problem: 
  Chest pain (2/25/2019) Active Problems: 
  HTN (hypertension) (2/25/2019) Controlled type 2 diabetes mellitus without complication, without long-term current use of insulin ([address] (2/26/2019) Esophageal dysmotility (2/26/2019) 68 y.o. male with PMH of (but not limited to) DM, HTN, achalasia followed by Dr. Regis Cho at [facility] who is seen in consultation at the request of QUITA Barba for atypical chest pain and epigastric abdominal pain. Mr. Sasha Irwin has a history of achalasia diagnosed 5 years ago at [facility] [address] [place] [address]. He has most recently been followed by Dr. Regis Cho at [facility]. He underwent pneumatic dilation last month and again most recently on Thursday 2/21/19. He developed epigastric abdominal pain radiating into his back following exercise on 2/25. He has had some relief with NTG. His Troponin is negative <0.02 on 2/26. GILBERTO is scheduled. Plan: 1. Obtain UGI with gastrografin 2. Keep NPO until imaging 3. Continue pantoprazole 40mg bid IV 4. Lipase ATTENDING NOTE:  I have seen and examined the patient along with my NP/PA. The assessment and plan above is my own. HE HAD EGD AT [facility] 1/19 WITH 60FR CAN DILATION INDUCING A 2CM TEAR AT EGJ A RECENT EGD WITH PNEUMATIC DILATION, 30MM, WITH POST DILATION UGI WITHOUT PERFORATION 
NOT TAKING PPI REGULARLY, (HAD LA GRADE B ESOPHAGITIS ON JAN's EGD) WILL CHECK GG UGI - IF NORMAL ADVANCE DIET 
PPI BID NOW AND AFTER DISCHARGE UNTIL F/U 
F/U WITH GI AFTER DISCHARGE. I REMAIN UNCERTAIN  IF THIS IS GI OR CARDIAC PAIN. IF CARDIAC IS RULED OUT AND HE ISNT RESPONDING TO PPI's, will consider gallbladder w/u. MD Constantine Armendariz. Devan WillinghamDustin V[place] [address] [facility] Patient is seen and examined in collaboration with Dr. Inés Tay. Assessment and plan as per Dr. Inés Tay.

## 2019-02-26 NOTE — PROGRESS NOTES
SW met with pt and his spouse at bedside. He is AAOx4 and able to make needs known. He lives in a two story home with his wife. They usually use the garage entrance into the home. Demographics verified. Pt is retired. He is able to perform ADL's and ambulate without assistance. Pt still drives. No other issues at this time. SW will continue to monitor and follow up. Care Management Interventions PCP Verified by CM: Yes Mode of Transport at Discharge: Self Transition of Care Consult (CM Consult): Discharge Planning Discharge Durable Medical Equipment: No 
Physical Therapy Consult: No 
Occupational Therapy Consult: No 
Speech Therapy Consult: No 
Current Support Network: Lives with Spouse, Own Home Confirm Follow Up Transport: Family Plan discussed with Pt/Family/Caregiver: Yes Freedom of Choice Offered: Yes Discharge Location Discharge Placement: Home

## 2019-02-26 NOTE — PROGRESS NOTES
[facility]

## 2019-02-26 NOTE — PROGRESS NOTES
TRANSFER - IN REPORT: 
 
Verbal report received from MD Petey Soto on Ayla Reagan being received from MD Garner for routine progression of care. Report consisted of patients Situation, Background, Assessment and Recommendations(SBAR). Information from the following report(s) SBAR was reviewed. Opportunity for questions and clarification was provided. Assessment completed upon patients arrival to unit and care assumed. Patient received to room 334. Patient connected to monitor and assessment completed. Plan of care reviewed. Patient oriented to room and call light. Patient aware to use call light to communicate any chest pain or needs. Admission skin assessment completed with second RN and reveals the following: Skin assessment completed. Sacrum dry intact with no redness and  blanchable. Heals intact. Scar on back of neck and. Encouraged repositioning.

## 2019-02-27 ENCOUNTER — APPOINTMENT (OUTPATIENT)
Dept: NUCLEAR MEDICINE | Age: 74
DRG: 313 | End: 2019-02-27
Attending: INTERNAL MEDICINE
Payer: MEDICARE

## 2019-02-27 ENCOUNTER — APPOINTMENT (OUTPATIENT)
Dept: GENERAL RADIOLOGY | Age: 74
DRG: 313 | End: 2019-02-27
Attending: NURSE PRACTITIONER
Payer: MEDICARE

## 2019-02-27 VITALS
OXYGEN SATURATION: 97 % | HEIGHT: 71 IN | RESPIRATION RATE: 18 BRPM | HEART RATE: 97 BPM | TEMPERATURE: 98.2 F | DIASTOLIC BLOOD PRESSURE: 97 MMHG | SYSTOLIC BLOOD PRESSURE: 150 MMHG | WEIGHT: 178.3 LBS | BODY MASS INDEX: 24.96 KG/M2

## 2019-02-27 PROBLEM — I10 HYPERTENSION: Status: ACTIVE | Noted: 2019-02-27

## 2019-02-27 LAB
ALBUMIN SERPL-MCNC: 3 G/DL (ref 3.2–4.6)
ALBUMIN/GLOB SERPL: 1.1 {RATIO} (ref 1.2–3.5)
ALP SERPL-CCNC: 71 U/L (ref 50–136)
ALT SERPL-CCNC: 21 U/L (ref 12–65)
ANION GAP SERPL CALC-SCNC: 8 MMOL/L (ref 7–16)
AST SERPL-CCNC: 18 U/L (ref 15–37)
BASOPHILS # BLD: 0 K/UL (ref 0–0.2)
BASOPHILS NFR BLD: 1 % (ref 0–2)
BILIRUB SERPL-MCNC: 0.4 MG/DL (ref 0.2–1.1)
BUN SERPL-MCNC: 12 MG/DL (ref 8–23)
CALCIUM SERPL-MCNC: 8.3 MG/DL (ref 8.3–10.4)
CHLORIDE SERPL-SCNC: 111 MMOL/L (ref 98–107)
CO2 SERPL-SCNC: 25 MMOL/L (ref 21–32)
CREAT SERPL-MCNC: 1.21 MG/DL (ref 0.8–1.5)
DIFFERENTIAL METHOD BLD: ABNORMAL
EOSINOPHIL # BLD: 0.2 K/UL (ref 0–0.8)
EOSINOPHIL NFR BLD: 3 % (ref 0.5–7.8)
ERYTHROCYTE [DISTWIDTH] IN BLOOD BY AUTOMATED COUNT: 14.1 % (ref 11.9–14.6)
GLOBULIN SER CALC-MCNC: 2.7 G/DL (ref 2.3–3.5)
GLUCOSE BLD STRIP.AUTO-MCNC: 102 MG/DL (ref 65–100)
GLUCOSE SERPL-MCNC: 95 MG/DL (ref 65–100)
HCT VFR BLD AUTO: 41.4 % (ref 41.1–50.3)
HGB BLD-MCNC: 13 G/DL (ref 13.6–17.2)
IMM GRANULOCYTES # BLD AUTO: 0 K/UL (ref 0–0.5)
IMM GRANULOCYTES NFR BLD AUTO: 0 % (ref 0–5)
LYMPHOCYTES # BLD: 2.2 K/UL (ref 0.5–4.6)
LYMPHOCYTES NFR BLD: 31 % (ref 13–44)
MCH RBC QN AUTO: 29 PG (ref 26.1–32.9)
MCHC RBC AUTO-ENTMCNC: 31.4 G/DL (ref 31.4–35)
MCV RBC AUTO: 92.2 FL (ref 79.6–97.8)
MONOCYTES # BLD: 0.7 K/UL (ref 0.1–1.3)
MONOCYTES NFR BLD: 10 % (ref 4–12)
NEUTS SEG # BLD: 3.8 K/UL (ref 1.7–8.2)
NEUTS SEG NFR BLD: 55 % (ref 43–78)
NRBC # BLD: 0 K/UL (ref 0–0.2)
PLATELET # BLD AUTO: 128 K/UL (ref 150–450)
PMV BLD AUTO: 12.1 FL (ref 9.4–12.3)
POTASSIUM SERPL-SCNC: 3.5 MMOL/L (ref 3.5–5.1)
PROT SERPL-MCNC: 5.7 G/DL (ref 6.3–8.2)
RBC # BLD AUTO: 4.49 M/UL (ref 4.23–5.6)
SODIUM SERPL-SCNC: 144 MMOL/L (ref 136–145)
WBC # BLD AUTO: 6.9 K/UL (ref 4.3–11.1)

## 2019-02-27 PROCEDURE — 74240 X-RAY XM UPR GI TRC 1CNTRST: CPT

## 2019-02-27 PROCEDURE — 74011636320 HC RX REV CODE- 636/320: Performed by: INTERNAL MEDICINE

## 2019-02-27 PROCEDURE — 85025 COMPLETE CBC W/AUTO DIFF WBC: CPT

## 2019-02-27 PROCEDURE — 74011250636 HC RX REV CODE- 250/636: Performed by: PHYSICIAN ASSISTANT

## 2019-02-27 PROCEDURE — 74011250637 HC RX REV CODE- 250/637: Performed by: FAMILY MEDICINE

## 2019-02-27 PROCEDURE — 99218 HC RM OBSERVATION: CPT

## 2019-02-27 PROCEDURE — 80053 COMPREHEN METABOLIC PANEL: CPT

## 2019-02-27 PROCEDURE — 93017 CV STRESS TEST TRACING ONLY: CPT | Performed by: INTERNAL MEDICINE

## 2019-02-27 PROCEDURE — 74011000250 HC RX REV CODE- 250: Performed by: PHYSICIAN ASSISTANT

## 2019-02-27 PROCEDURE — 36415 COLL VENOUS BLD VENIPUNCTURE: CPT

## 2019-02-27 PROCEDURE — 65660000000 HC RM CCU STEPDOWN

## 2019-02-27 PROCEDURE — A9502 TC99M TETROFOSMIN: HCPCS

## 2019-02-27 PROCEDURE — C9113 INJ PANTOPRAZOLE SODIUM, VIA: HCPCS | Performed by: PHYSICIAN ASSISTANT

## 2019-02-27 PROCEDURE — 82962 GLUCOSE BLOOD TEST: CPT

## 2019-02-27 RX ORDER — PANTOPRAZOLE SODIUM 20 MG/1
20 TABLET, DELAYED RELEASE ORAL 2 TIMES DAILY
Qty: 60 TAB | Refills: 0 | Status: SHIPPED | OUTPATIENT
Start: 2019-02-27 | End: 2019-08-06

## 2019-02-27 RX ORDER — GUAIFENESIN 100 MG/5ML
81 LIQUID (ML) ORAL DAILY
Qty: 30 TAB | Refills: 0 | Status: SHIPPED | OUTPATIENT
Start: 2019-02-28 | End: 2019-08-06

## 2019-02-27 RX ORDER — SODIUM CHLORIDE 0.9 % (FLUSH) 0.9 %
10 SYRINGE (ML) INJECTION
Status: COMPLETED | OUTPATIENT
Start: 2019-02-27 | End: 2019-02-27

## 2019-02-27 RX ORDER — BENAZEPRIL HYDROCHLORIDE AND HYDROCHLOROTHIAZIDE 20; 12.5 MG/1; MG/1
1 TABLET ORAL DAILY
Qty: 30 TAB | Refills: 0 | Status: SHIPPED | OUTPATIENT
Start: 2019-02-27

## 2019-02-27 RX ADMIN — Medication 10 ML: at 09:42

## 2019-02-27 RX ADMIN — HYDROCHLOROTHIAZIDE: 12.5 CAPSULE ORAL at 08:05

## 2019-02-27 RX ADMIN — Medication 10 ML: at 08:37

## 2019-02-27 RX ADMIN — Medication 10 ML: at 05:11

## 2019-02-27 RX ADMIN — DIATRIZOATE MEGLUMINE AND DIATRIZOATE SODIUM 120 ML: 660; 100 LIQUID ORAL; RECTAL at 09:19

## 2019-02-27 RX ADMIN — ASPIRIN 81 MG 81 MG: 81 TABLET ORAL at 08:05

## 2019-02-27 RX ADMIN — SODIUM CHLORIDE 40 MG: 9 INJECTION, SOLUTION INTRAMUSCULAR; INTRAVENOUS; SUBCUTANEOUS at 08:05

## 2019-02-27 NOTE — DISCHARGE SUMMARY
Hospitalist Discharge Summary     Admit Date:  2019  8:09 PM   Name:  Rik Ryan   Age:  68 y.o.  :  1945   MRN:  [id number]   PCP:  Larry Aguero, Not On File  Treatment Team: Attending Provider: Delma Milton MD; Consulting Provider: Nathanael Loyd MD; Utilization Review: Dandre Mchugh RN; Consulting Provider: Laura Samuel MD; Care Manager: Antoni Pablo    Problem List for this Hospitalization:  Hospital Problems as of 2019 Never Reviewed          Codes Class Noted - Resolved POA    Hypertension ICD-10-CM: I10  ICD-9-CM: 401.9  2019 - Present Unknown        Controlled type 2 diabetes mellitus without complication, without long-term current use of insulin (HCC) (Chronic) ICD-10-CM: E11.9  ICD-9-CM: 250.00  2019 - Present Yes        Esophageal dysmotility ICD-10-CM: K22.4  ICD-9-CM: 530.5  2019 - Present Unknown        * (Principal) Chest pain ICD-10-CM: R07.9  ICD-9-CM: 786.50  2019 - Present Yes        HTN (hypertension) (Chronic) ICD-10-CM: I10  ICD-9-CM: 401.9  2019 - Present Yes              Admission HPI from 2019:    \"Patient is a 68 y.o. male who is directly admitted to us from Urgent care facility. He went there due to acute onset of chest pain and significant HTN. reprts the pain as a pressure sensation, started out as a 8/10, now about a 4/10. He has a h/o achalasia and had his esophagus stretched a couple weeks ago. He states that he has done well with that and is off of pain meds. He reports that this pain is very different than prior esophageal pains. Denies SOB, diaphoresis, radiation of pain, fever/chills, cough, n/v, or syncopal feelings. He did check his BP at home and it was very high. He has had recent changes in his BP meds within the last few weeks, but it has never been this high before. He reports a stress test done 3-4 years ago and negative. Denies any cardiac diagnosis.   His pain improved after NTG, but did not resolve completely. Pt also notes that he recently completed a fairly vigorous walk, up hills and brisk pace; did not have any symptoms, was not even winded. \"    Hospital Course:  As noted above pt is a 69 yo male with PMH of DM, HTN, and achalasia with recent dilation. PT presented to the ED with chest pain and hypertension. Pt reports that he got very worried as he was checking his blood pressure when the pain started. Pt also notes that he is getting established here, recently moved from [place]. Pt has an appt with his new PCP next Wednesday. Pt seen by GI and cardiology. Pt had a gastrocraffin UGI that was normal.  He also had a NM myocardial perfusion study that was negative for any acute ischemia. Pt started on an ACEI/HCTZ, will d/c on this med   Pt anxious to discharge home, discussed that he should continue to check his blood pressure and suggested that he write down the readings for his doctor to review. To follow up with GI in March. Follow up instructions and discharge meds at bottom of this note. Plan was discussed with patient and care team.  All questions answered. Patient was stable at time of discharge. 10 systems reviewed and negative except as noted in HPI. Diagnostic Imaging/Tests:   No results found.     Echocardiogram results:  Results for orders placed or performed during the hospital encounter of 19   2D ECHO COMPLETE ADULT (TTE) [address] [facility]  [address] [facility], [facility]  [phone]    Transthoracic Echocardiogram  2D, M-mode, Doppler, and Color Doppler    Patient: Estiven Cortes  MR #: [id number]  : 1945  Age: 68 years  Gender: Male  Study date: 2019  Account #: [de-identified]  Height: 71 in  Weight: 180.6 lb  BSA: 2.02 mï¾²  Status:Routine  Location: 334  BP: 123/ 77    Allergies: NO KNOWN ALLERGENS    Sonographer:  Ben Yates RDCS  Group:  [facility]  Referring Physician:  Mindy Arnold. Froylan Gaspar MD  Reading Physician:  Amalia Gómez MD    INDICATIONS: Benign Hypertension, Chest Pain    PROCEDURE: This was a routine study. A transthoracic echocardiogram was  performed. The study included complete 2D imaging, M-mode, complete spectral  Doppler, and color Doppler. Intravenous contrast (Definity, 2 ml) was  administered. Image quality was adequate. LEFT VENTRICLE: Size was normal. Systolic function was at the lower limits of  normal. Ejection fraction was estimated in the range of 50 % to 55 %. Although  no diagnostic regional wall motion abnormality was identified, this   possibility  cannot be completely excluded on the basis of this study. Wall thickness was  normal. Left ventricular diastolic function Grade 1. Average E/e' of 15. VENTRICULAR SEPTUM: Thickness was mildly increased. RIGHT VENTRICLE: The size was normal. Systolic function was normal.    LEFT ATRIUM: Size was normal.    RIGHT ATRIUM: Size was normal.    SYSTEMIC VEINS: IVC: The inferior vena cava was normal in size and course. AORTIC VALVE: The valve was structurally normal, tri-commissural. There was   no  evidence for stenosis. There was no insufficiency. MITRAL VALVE: There was mild annular calcification. Valve structure was   normal.  There was no evidence for stenosis. There was mild regurgitation. TRICUSPID VALVE: The valve structure was normal. There was no evidence for  stenosis. There was trace regurgitation. PULMONIC VALVE: Not well visualized. PERICARDIUM: There was no pericardial effusion. AORTA: The root exhibited normal size. SUMMARY:    -  Left ventricle: Systolic function was at the lower limits of normal.  Ejection fraction was estimated in the range of 50 % to 55 %. Although no  diagnostic regional wall motion abnormality was identified, this possibility  cannot be completely excluded on the basis of this study. -  Mitral valve:  There was mild annular calcification. There was mild  regurgitation. SYSTEM MEASUREMENT TABLES    2D mode  AoR Diam (2D): 3.2 cm  LA Dimension (2D): 3.8 cm  Left Atrium Systolic Volume Index; Method of Disks, Biplane; 2D mode;: 30.7  ml/m2  IVS/LVPW (2D): 0.9  IVSd (2D): 1.1 cm  LVIDd (2D): 5 cm  LVIDs (2D): 2.8 cm  LVPWd (2D): 1.3 cm  RVIDd (2D): 3.3 cm    Unspecified Scan Mode  Peak Grad; Mean; Antegrade Flow: 12 mm[Hg]  Vmax;  Antegrade Flow: 170 cm/s    Prepared and signed by    Neo Tom MD  Signed 26-Feb-2019 17:31:10           All Micro Results     None          Labs: Results:       BMP, Mg, Phos Recent Labs     02/27/19 0334 02/26/19 0318    142   K 3.5 3.4*   * 107   CO2 25 26   AGAP 8 9   BUN 12 15   CREA 1.21 1.18   CA 8.3 8.4   GLU 95 116*      CBC Recent Labs     02/27/19 0334 02/26/19 0318   WBC 6.9 6.6   RBC 4.49 4.46   HGB 13.0* 13.0*   HCT 41.4 40.6*   * 139*   GRANS 55 53   LYMPH 31 33   EOS 3 2   MONOS 10 11   BASOS 1 1   IG 0 0   ANEU 3.8 3.5   ABL 2.2 2.2   ANY 0.2 0.2   ABM 0.7 0.7   ABB 0.0 0.1   AIG 0.0 0.0      LFT Recent Labs     02/27/19 0334 02/26/19 0318   SGOT 18 18   ALT 21 21   AP 71 90   TP 5.7* 6.0*   ALB 3.0* 3.1*   GLOB 2.7 2.9   AGRAT 1.1* 1.1*      Cardiac Testing Lab Results   Component Value Date/Time     02/25/2019 09:59 PM    Troponin-I, Qt. <0.02 (L) 02/26/2019 09:54 AM    Troponin-I, Qt. <0.02 (L) 02/25/2019 09:59 PM      Coagulation Tests No results found for: PTP, INR, APTT   A1c No results found for: HBA1C, HGBE8, XJB7GTRR   Lipid Panel Lab Results   Component Value Date/Time    Cholesterol, total 134 02/26/2019 03:18 AM    HDL Cholesterol 40 02/26/2019 03:18 AM    LDL, calculated 69.4 02/26/2019 03:18 AM    VLDL, calculated 24.6 (H) 02/26/2019 03:18 AM    Triglyceride 123 02/26/2019 03:18 AM    CHOL/HDL Ratio 3.4 02/26/2019 03:18 AM      Thyroid Panel No results found for: TSH, T4, FT4, TT3, T3U, TSHEXT     Most Recent UA No results found for: COLOR, APPRN, REFSG, CARLTON, PROTU, GLUCU, KETU, BILU, BLDU, UROU, CHRIS, LEUKU     No Known Allergies    There is no immunization history on file for this patient. All Labs from Last 24 Hrs:  Recent Results (from the past 24 hour(s))   GLUCOSE, POC    Collection Time: 02/26/19 10:09 PM   Result Value Ref Range    Glucose (POC) 105 (H) 65 - 100 mg/dL   CBC WITH AUTOMATED DIFF    Collection Time: 02/27/19  3:34 AM   Result Value Ref Range    WBC 6.9 4.3 - 11.1 K/uL    RBC 4.49 4.23 - 5.6 M/uL    HGB 13.0 (L) 13.6 - 17.2 g/dL    HCT 41.4 41.1 - 50.3 %    MCV 92.2 79.6 - 97.8 FL    MCH 29.0 26.1 - 32.9 PG    MCHC 31.4 31.4 - 35.0 g/dL    RDW 14.1 11.9 - 14.6 %    PLATELET 186 (L) 043 - 450 K/uL    MPV 12.1 9.4 - 12.3 FL    ABSOLUTE NRBC 0.00 0.0 - 0.2 K/uL    DF AUTOMATED      NEUTROPHILS 55 43 - 78 %    LYMPHOCYTES 31 13 - 44 %    MONOCYTES 10 4.0 - 12.0 %    EOSINOPHILS 3 0.5 - 7.8 %    BASOPHILS 1 0.0 - 2.0 %    IMMATURE GRANULOCYTES 0 0.0 - 5.0 %    ABS. NEUTROPHILS 3.8 1.7 - 8.2 K/UL    ABS. LYMPHOCYTES 2.2 0.5 - 4.6 K/UL    ABS. MONOCYTES 0.7 0.1 - 1.3 K/UL    ABS. EOSINOPHILS 0.2 0.0 - 0.8 K/UL    ABS. BASOPHILS 0.0 0.0 - 0.2 K/UL    ABS. IMM. GRANS. 0.0 0.0 - 0.5 K/UL   METABOLIC PANEL, COMPREHENSIVE    Collection Time: 02/27/19  3:34 AM   Result Value Ref Range    Sodium 144 136 - 145 mmol/L    Potassium 3.5 3.5 - 5.1 mmol/L    Chloride 111 (H) 98 - 107 mmol/L    CO2 25 21 - 32 mmol/L    Anion gap 8 7 - 16 mmol/L    Glucose 95 65 - 100 mg/dL    BUN 12 8 - 23 MG/DL    Creatinine 1.21 0.8 - 1.5 MG/DL    GFR est AA >60 >60 ml/min/1.73m2    GFR est non-AA >60 >60 ml/min/1.73m2    Calcium 8.3 8.3 - 10.4 MG/DL    Bilirubin, total 0.4 0.2 - 1.1 MG/DL    ALT (SGPT) 21 12 - 65 U/L    AST (SGOT) 18 15 - 37 U/L    Alk.  phosphatase 71 50 - 136 U/L    Protein, total 5.7 (L) 6.3 - 8.2 g/dL    Albumin 3.0 (L) 3.2 - 4.6 g/dL    Globulin 2.7 2.3 - 3.5 g/dL    A-G Ratio 1.1 (L) 1.2 - 3.5     GLUCOSE, POC    Collection Time: 02/27/19  6:02 AM   Result Value Ref Range    Glucose (POC) 102 (H) 65 - 100 mg/dL       Discharge Exam:  Patient Vitals for the past 24 hrs:   Temp Pulse Resp BP SpO2   02/27/19 1352 98.2 °F (36.8 °C) 97 18 (!) 150/97 97 %   02/27/19 0803 98.1 °F (36.7 °C) (!) 53 18 (!) 172/97 98 %   02/27/19 0504 97.7 °F (36.5 °C) (!) 56 18 158/78 97 %   02/27/19 0050 97.8 °F (36.6 °C) (!) 50 18 143/74 97 %   02/26/19 2122 97.9 °F (36.6 °C) (!) 55 18 149/73 96 %   02/26/19 1631 97.9 °F (36.6 °C) (!) 55 18 167/83 96 %     Oxygen Therapy  O2 Sat (%): 97 % (02/27/19 1352)  Pulse via Oximetry: 97 beats per minute (02/27/19 1352)  O2 Device: Room air (02/27/19 0720)    Intake/Output Summary (Last 24 hours) at 2/27/2019 1511  Last data filed at 2/27/2019 1354  Gross per 24 hour   Intake 720 ml   Output 550 ml   Net 170 ml         Physical exam:  General:    Well nourished. Alert. No distress. Eyes:   Normal sclera. Extraocular movements intact. ENT:  Normocephalic, atraumatic. Moist mucous membranes  CV:   Regular rate and rhythm. No murmur, rub, or gallop. Lungs:  Clear to auscultation bilaterally. No wheezing, rhonchi, or rales. Abdomen: Soft, nontender, nondistended. Bowel sounds normal.   Extremities: Warm and dry. No cyanosis or edema. Neurologic: No focal deficits  Skin:     No rashes or jaundice. Psych:  Normal mood and affect. Discharge Info:   Current Discharge Medication List      START taking these medications    Details   aspirin 81 mg chewable tablet Take 1 Tab by mouth daily. Qty: 30 Tab, Refills: 0      benazepril-hydroCHLOROthiazide (LOTENSIN HCT) 20-12.5 mg per tablet Take 1 Tab by mouth daily. Qty: 30 Tab, Refills: 0      pantoprazole (PROTONIX) 20 mg tablet Take 1 Tab by mouth two (2) times a day. Qty: 60 Tab, Refills: 0         CONTINUE these medications which have NOT CHANGED    Details   metFORMIN (GLUCOPHAGE) 500 mg tablet Take 500 mg by mouth daily (with breakfast).       carvedilol (COREG) 12.5 mg tablet Take  by mouth two (2) times daily (with meals). levothyroxine (SYNTHROID) 25 mcg tablet Take 25 mcg by mouth Daily (before breakfast). STOP taking these medications       raNITIdine (ZANTAC) 300 mg tab Comments:   Reason for Stopping:                 Disposition: home    Activity: Activity as tolerated  Diet: DIET CARDIAC Regular    Follow-up Appointments   Procedures    FOLLOW UP VISIT Appointment in: One Week See new PCP as scheduled     See new PCP as scheduled     Standing Status:   Standing     Number of Occurrences:   1     Order Specific Question:   Appointment in     Answer: One Week         Follow-up Information     Follow up With Specialties Details Why Contact Info    Jean Gaytan MD Gastroenterology Go on 3/27/2019 Hospital follow up at 1:30pm [address] [facility] [address]  [phone]      Bsi, Not On File    Not On File (58) Patient has a PCP but that physician is not listed in [facility]. Case reviewed with supervising physician - WILFREDO Barney MD    Time spent in patient discharge planning and coordination 35 minutes.     Signed:  Kay Bauer NP-C

## 2019-02-27 NOTE — PROCEDURES
[facility] MED CARDIAC STRESS    Name:  Kaelyn Martínez  MR#:  [id number]  :  1945  ACCOUNT #:  [de-identified]  DATE OF SERVICE:  2019      PROCEDURE:  Exercise Cardiolite. INDICATION:  Recurrent chest pain in a patient with abnormal EKG. Exercise Cardiolite for evaluation for ischemia. TECHNIQUE AND FINDINGS:  After informed consent was obtained, the patient was brought to the nuclear medicine suite. He was initially injected with 10.34 mCi of technetium 99m tetrofosmin followed by rest imaging via standard protocol. The patient subsequently underwent a stress testing via a standard exercise Otto protocol. At peak stress, he was injected with 31.5 mCi of technetium 99m tetrofosmin followed by stress imaging via standard protocol. FINDINGS:  EXECISE EK.  Baseline EKG shows sinus rhythm. Right bundle branch block with left anterior fascicular block. Had nonspecific T-wave abnormalities in the inferior leads. 2.  The patient exercised according to Otto protocol for 9 minutes. He achieved a maximum workload of 10.1 METs. Resting heart rate of 56 beats per minute tin to a maximum heart rate of 123 beats per minute. This value represents 83% of maximal age-predicated heart rate. The resting blood pressure 170/78, tin to a maximum blood pressure of 220/80.  3.  With exercise, there were no significant changes in his ST or T-wave segments suggestive of ischemia. No ST depression was noted. Persistent right bundle branch block with left anterior fascicular block was noted. CONCLUSIONS:  1.  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 The patient has a known history of chronically abnormal EKG. 2.  No evidence of visible ischemia. He had a good systemic workload of 10.1 METs. 3.  No exercise-induced arrhythmias. 4.  Please see Cardiolite images. CARDIOLITE IMAGES:  1.   Resting images showed moderately decreased uptake in the inferior segments with normal perfusion to other myocardial segments. 2.  Stress images showed no significant change. 3.  This is confirmed by quantitative analysis. 4.  Gated wall motion analysis showed normal left ventricular systolic function with normal wall motion. There was no wall motion of the inferior wall. Ejection fraction was 51%. CONCLUSIONS:  1. Fixed inferior wall defects with normal wall motion and no evidence of reversible ischemia. I suspect this is diaphragmatic attenuation. 2.  Normal left ventricular systolic function. 3.   Low risk stress test.      MD MALIA Dukes/[email or web address]/  D:  02/27/2019 14:19  T:  02/27/2019 14:45  JOB #:  [id number]  CC:

## 2019-02-27 NOTE — PROGRESS NOTES
GI/Dr. Dalal Erie Attempted to see patient, he is off the floor for nuclear medicine study- will re-attempt to see later today. Mane Humphrey PA-C Gastroenterology Associate

## 2019-02-27 NOTE — PROGRESS NOTES
Problem: Falls - Risk of 
Goal: *Absence of Falls Document Loly Latonya Fall Risk and appropriate interventions in the flowsheet. Outcome: Progressing Towards Goal 
Fall Risk Interventions: 
  
 
  
 
Medication Interventions: Evaluate medications/consider consulting pharmacy, Patient to call before getting OOB, Teach patient to arise slowly

## 2019-02-27 NOTE — DISCHARGE INSTRUCTIONS
Patient Education     Chest Pain: Care Instructions  Your Care Instructions    There are many things that can cause chest pain. Some are not serious and will get better on their own in a few days. But some kinds of chest pain need more testing and treatment. Your doctor may have recommended a follow-up visit in the next 8 to 12 hours. If you are not getting better, you may need more tests or treatment. Even though your doctor has released you, you still need to watch for any problems. The doctor carefully checked you, but sometimes problems can develop later. If you have new symptoms or if your symptoms do not get better, get medical care right away. If you have worse or different chest pain or pressure that lasts more than 5 minutes or you passed out (lost consciousness), call 911 or seek other emergency help right away. A medical visit is only one step in your treatment. Even if you feel better, you still need to do what your doctor recommends, such as going to all suggested follow-up appointments and taking medicines exactly as directed. This will help you recover and help prevent future problems. How can you care for yourself at home? · Rest until you feel better. · Take your medicine exactly as prescribed. Call your doctor if you think you are having a problem with your medicine. · Do not drive after taking a prescription pain medicine. When should you call for help? Call 911 if:    · You passed out (lost consciousness).     · You have severe difficulty breathing.     · You have symptoms of a heart attack. These may include:  ? Chest pain or pressure, or a strange feeling in your chest.  ? Sweating. ? Shortness of breath. ? Nausea or vomiting. ? Pain, pressure, or a strange feeling in your back, neck, jaw, or upper belly or in one or both shoulders or arms. ? Lightheadedness or sudden weakness. ? A fast or irregular heartbeat.   After you call 911, the  may tell you to chew 1 adult-strength or 2 to 4 low-dose aspirin. Wait for an ambulance. Do not try to drive yourself.    Call your doctor today if:    · You have any trouble breathing.     · Your chest pain gets worse.     · You are dizzy or lightheaded, or you feel like you may faint.     · You are not getting better as expected.     · You are having new or different chest pain. Where can you learn more? Go to http://alpa-anoop.info/. Enter A120 in the search box to learn more about \"Chest Pain: Care Instructions. \"  Current as of: September 23, 2018  Content Version: 11.9  © 7380-5575 [employer]. Care instructions adapted under license by [employer] (which disclaims liability or warranty for this information). If you have questions about a medical condition or this instruction, always ask your healthcare professional. [place] [address] any warranty or liability for your use of this information. Patient Education        High Blood Pressure: Care Instructions  Overview    It's normal for blood pressure to go up and down throughout the day. But if it stays up, you have high blood pressure. Another name for high blood pressure is hypertension. Despite what a lot of people think, high blood pressure usually doesn't cause headaches or make you feel dizzy or lightheaded. It usually has no symptoms. But it does increase your risk of stroke, heart attack, and other problems. You and your doctor will talk about your risks of these problems based on your blood pressure. Your doctor will give you a goal for your blood pressure. Your goal will be based on your health and your age. Lifestyle changes, such as eating healthy and being active, are always important to help lower blood pressure. You might also take medicine to reach your blood pressure goal.  Follow-up care is a key part of your treatment and safety.  Be sure to make and go to all appointments, and call your doctor if you are having problems. It's also a good idea to know your test results and keep a list of the medicines you take. How can you care for yourself at home? Medical treatment  · If you stop taking your medicine, your blood pressure will go back up. You may take one or more types of medicine to lower your blood pressure. Be safe with medicines. Take your medicine exactly as prescribed. Call your doctor if you think you are having a problem with your medicine. · Talk to your doctor before you start taking aspirin every day. Aspirin can help certain people lower their risk of a heart attack or stroke. But taking aspirin isn't right for everyone, because it can cause serious bleeding. · See your doctor regularly. You may need to see the doctor more often at first or until your blood pressure comes down. · If you are taking blood pressure medicine, talk to your doctor before you take decongestants or anti-inflammatory medicine, such as ibuprofen. Some of these medicines can raise blood pressure. · Learn how to check your blood pressure at home. Lifestyle changes  · Stay at a healthy weight. This is especially important if you put on weight around the waist. Losing even 10 pounds can help you lower your blood pressure. · If your doctor recommends it, get more exercise. Walking is a good choice. Bit by bit, increase the amount you walk every day. Try for at least 30 minutes on most days of the week. You also may want to swim, bike, or do other activities. · Avoid or limit alcohol. Talk to your doctor about whether you can drink any alcohol. · Try to limit how much sodium you eat to less than 2,300 milligrams (mg) a day. Your doctor may ask you to try to eat less than 1,500 mg a day. · Eat plenty of fruits (such as bananas and oranges), vegetables, legumes, whole grains, and low-fat dairy products. · Lower the amount of saturated fat in your diet.  Saturated fat is found in animal products such as milk, cheese, and meat. Limiting these foods may help you lose weight and also lower your risk for heart disease. · Do not smoke. Smoking increases your risk for heart attack and stroke. If you need help quitting, talk to your doctor about stop-smoking programs and medicines. These can increase your chances of quitting for good. When should you call for help? Call 911 anytime you think you may need emergency care. This may mean having symptoms that suggest that your blood pressure is causing a serious heart or blood vessel problem. Your blood pressure may be over 180/120.   For example, call 911 if:    · You have symptoms of a heart attack. These may include:  ? Chest pain or pressure, or a strange feeling in the chest.  ? Sweating. ? Shortness of breath. ? Nausea or vomiting. ? Pain, pressure, or a strange feeling in the back, neck, jaw, or upper belly or in one or both shoulders or arms. ? Lightheadedness or sudden weakness. ? A fast or irregular heartbeat.     · You have symptoms of a stroke. These may include:  ? Sudden numbness, tingling, weakness, or loss of movement in your face, arm, or leg, especially on only one side of your body. ? Sudden vision changes. ? Sudden trouble speaking. ? Sudden confusion or trouble understanding simple statements. ? Sudden problems with walking or balance. ? A sudden, severe headache that is different from past headaches.     · You have severe back or belly pain.    Do not wait until your blood pressure comes down on its own. Get help right away.   Call your doctor now or seek immediate care if:    · Your blood pressure is much higher than normal (such as 180/120 or higher), but you don't have symptoms.     · You think high blood pressure is causing symptoms, such as:  ? Severe headache.  ? Blurry vision.    Watch closely for changes in your health, and be sure to contact your doctor if:    · Your blood pressure measures higher than your doctor recommends at least 2 times.  That means the top number is higher or the bottom number is higher, or both.     · You think you may be having side effects from your blood pressure medicine. Where can you learn more? Go to http://alpa-anoop.info/. Enter Z071 in the search box to learn more about \"High Blood Pressure: Care Instructions. \"  Current as of: July 22, 2018  Content Version: 11.9  © 4279-2328 [employer]. Care instructions adapted under license by [employer] (which disclaims liability or warranty for this information). If you have questions about a medical condition or this instruction, always ask your healthcare professional. [place] [address] any warranty or liability for your use of this information. Patient Education        Cardiac Perfusion Scan (Medicine): About This Test  What is it? A cardiac perfusion scan measures the amount of blood in your heart muscle at rest and after your heart has been made to work hard. During the scan, a camera takes pictures of your heart after a radioactive tracer is injected into a vein in your arm. The tracer travels through the blood and into your heart. As the tracer moves through your heart, areas that have good blood flow absorb the tracer. Areas that do not absorb the tracer may not be getting enough blood or may have been damaged by a heart attack. The pictures show this difference. Two sets of pictures may be made during the test. One set is taken while you are resting. Another set is taken after your heart has been made to work harder (called a stress test). The heart can be stressed by using medicine or exercise. This information is about using medicine to stress the heart. This test is also known by other names, including myocardial perfusion scan, myocardial perfusion imaging, thallium scan, sestamibi cardiac scan, and nuclear stress test.  Why is this test done?   The test is often done to find out what may be causing chest pain or pressure. It may be done after a heart attack to see if areas of the heart are not getting enough blood or to find out how much your heart has been damaged from the heart attack. How can you prepare for the test?  Tell your doctor if:  · You take medicine for an erection problem, such as sildenafil (Viagra), tadalafil (Cialis), and vardenafil (Levitra). You may need to take nitroglycerin during this test, which can cause a serious reaction if you have taken a medicine for an erection problem within the past 48 hours. · You have had bleeding problems, or if you take aspirin or some other blood thinner. · You are or might be pregnant. · You are breastfeeding. Don't use your breast milk for 1 to 2 days after the scan. Use formula instead. Do not have any caffeine, such as coffee or tea, for 24 hours before the test.  What happens during the test?  Resting or baseline scan  · You will take your top off and be given a gown to wear. · Electrodes will be attached to your chest to keep track of your heartbeats. · Your arm will be cleaned. You will have a tube, called an IV, going into your arm. A small amount of the radioactive tracer will be put in the IV. · You will lie on your back or your stomach on a table with a large camera positioned above your chest. The camera records the tracer's signals as it moves through your blood. The camera does not produce any radiation, so you are not exposed to any additional radiation while the scan is being done. · You will be asked to remain very still during each scan, which takes about 5 to 10 minutes. The camera will move to take more pictures at different angles. Several scans will be taken. This test takes about 30 to 40 minutes. Stress scan using medicine  The stress scan is done in two parts. In many hospitals, you first have the resting scan. You are then given a medicine that makes your heart work harder and you have another scan.  Sometimes the stress scan is done first.  · You will have a test called an electrocardiogram (EKG or ECG), which takes about 5 to 10 minutes. You may have other EKGs during and after the stress test.  · Medicine will be put in your IV. It will make your heart work harder. You may get a headache and feel dizzy, flushed, and nauseated from the medicine, but these symptoms usually do not last long. · Your heartbeat and blood pressure may be checked. · Your symptoms such as chest pain and shortness of breath will be checked. · A few minutes after you get the medicine, another small amount of radioactive tracer is injected. You may be given something to reverse the medicine used to make your heart work harder. · You will wait for 30 to 40 minutes and then have another resting scan. See the \"Resting or baseline scan\" section. This test takes about 30 to 40 minutes. What else should you know about the test?  · Sometimes more pictures are taken 2 to 4 hours after the stress scan. · No electricity passes through your body during the test. There is no danger of getting an electrical shock. What happens after the test?  · You can go back to your usual activities right away. · You will probably be able to go home right away. · Drink plenty of fluids for the next 24 hours to help flush the tracer out of your body. If you have kidney, heart, or liver disease and have to limit fluids, talk with your doctor before you increase the amount of fluids you drink. When should you call for help? Call 911 anytime you think you may need emergency care. For example, call if:  · You passed out (lost consciousness). · You have been diagnosed with angina, and you have angina symptoms that do not go away with rest or are not getting better within 5 minutes after you take a dose of nitroglycerin. · You have symptoms of a heart attack. These may include:  ? Chest pain or pressure, or a strange feeling in the chest.  ? Sweating. ?  Shortness of breath. ? Nausea or vomiting. ? Pain, pressure, or a strange feeling in the back, neck, jaw, or upper belly or in one or both shoulders or arms. ? Lightheadedness or sudden weakness. ? A fast or irregular heartbeat. After you call 911, the  may tell you to chew 1 adult-strength or 2 to 4 low-dose aspirin. Wait for an ambulance. Do not try to drive yourself. Watch closely for changes in your health, and be sure to contact your doctor if you have any problems. Follow-up care is a key part of your treatment and safety. Be sure to make and go to all appointments, and call your doctor if you are having problems. It's also a good idea to keep a list of the medicines you take. Ask your doctor when you can expect to have your test results. Where can you learn more? Go to http://alpa-anoop.info/. Enter R320 in the search box to learn more about \"Cardiac Perfusion Scan (Medicine): About This Test.\"  Current as of: July 22, 2018  Content Version: 11.9  © 6316-9376 [employer]. Care instructions adapted under license by [employer] (which disclaims liability or warranty for this information). If you have questions about a medical condition or this instruction, always ask your healthcare professional. [address] any warranty or liability for your use of this information. Patient Education        Heart-Healthy Diet: Care Instructions  Your Care Instructions    A heart-healthy diet has lots of vegetables, fruits, nuts, beans, and whole grains, and is low in salt. It limits foods that are high in saturated fat, such as meats, cheeses, and fried foods. It may be hard to change your diet, but even small changes can lower your risk of heart attack and heart disease. Follow-up care is a key part of your treatment and safety. Be sure to make and go to all appointments, and call your doctor if you are having problems.  It's also a good idea to know your test results and keep a list of the medicines you take. How can you care for yourself at home? Watch your portions  · Learn what a serving is. A \"serving\" and a \"portion\" are not always the same thing. Make sure that you are not eating larger portions than are recommended. For example, a serving of pasta is ½ cup. A serving size of meat is 2 to 3 ounces. A 3-ounce serving is about the size of a deck of cards. Measure serving sizes until you are good at [employer]" them. Keep in mind that restaurants often serve portions that are 2 or 3 times the size of one serving. · To keep your energy level up and keep you from feeling hungry, eat often but in smaller portions. · Eat only the number of calories you need to stay at a healthy weight. If you need to lose weight, eat fewer calories than your body burns (through exercise and other physical activity). Eat more fruits and vegetables  · Eat a variety of fruit and vegetables every day. Dark green, deep orange, red, or yellow fruits and vegetables are especially good for you. Examples include spinach, carrots, peaches, and berries. · Keep carrots, celery, and other veggies handy for snacks. Buy fruit that is in season and store it where you can see it so that you will be tempted to eat it. · Cook dishes that have a lot of veggies in them, such as stir-fries and soups. Limit saturated and trans fat  · Read food labels, and try to avoid saturated and trans fats. They increase your risk of heart disease. Trans fat is found in many processed foods such as cookies and crackers. · Use olive or canola oil when you cook. Try cholesterol-lowering spreads, such as Benecol or Take Control. · Bake, broil, grill, or steam foods instead of frying them. · Choose lean meats instead of high-fat meats such as hot dogs and sausages. Cut off all visible fat when you prepare meat.   · Eat fish, skinless poultry, and meat alternatives such as soy products instead of high-fat meats. Soy products, such as tofu, may be especially good for your heart. · Choose low-fat or fat-free milk and dairy products. Eat fish  · Eat at least two servings of fish a week. Certain fish, such as salmon and tuna, contain omega-3 fatty acids, which may help reduce your risk of heart attack. Eat foods high in fiber  · Eat a variety of grain products every day. Include whole-grain foods that have lots of fiber and nutrients. Examples of whole-grain foods include oats, whole wheat bread, and brown rice. · Buy whole-grain breads and cereals, instead of white bread or pastries. Limit salt and sodium  · Limit how much salt and sodium you eat to help lower your blood pressure. · Taste food before you salt it. Add only a little salt when you think you need it. With time, your taste buds will adjust to less salt. · Eat fewer snack items, fast foods, and other high-salt, processed foods. Check food labels for the amount of sodium in packaged foods. · Choose low-sodium versions of canned goods (such as soups, vegetables, and beans). Limit sugar  · Limit drinks and foods with added sugar. These include candy, desserts, and soda pop. Limit alcohol  · Limit alcohol to no more than 2 drinks a day for men and 1 drink a day for women. Too much alcohol can cause health problems. When should you call for help? Watch closely for changes in your health, and be sure to contact your doctor if:    · You would like help planning heart-healthy meals. Where can you learn more? Go to http://alpa-anoop.info/. Enter V137 in the search box to learn more about \"Heart-Healthy Diet: Care Instructions. \"  Current as of: July 22, 2018  Content Version: 11.9  © 2364-5761 [employer]. Care instructions adapted under license by [employer] (which disclaims liability or warranty for this information).  If you have questions about a medical condition or this instruction, always ask your healthcare professional. [place] [address] any warranty or liability for your use of this information. DISCHARGE SUMMARY from Nurse    PATIENT INSTRUCTIONS:    After general anesthesia or intravenous sedation, for 24 hours or while taking prescription Narcotics:  · Limit your activities  · Do not drive and operate hazardous machinery  · Do not make important personal or business decisions  · Do  not drink alcoholic beverages  · If you have not urinated within 8 hours after discharge, please contact your surgeon on call. Report the following to your surgeon:  · Excessive pain, swelling, redness or odor of or around the surgical area  · Temperature over 100.5  · Nausea and vomiting lasting longer than 4 hours or if unable to take medications  · Any signs of decreased circulation or nerve impairment to extremity: change in color, persistent  numbness, tingling, coldness or increase pain  · Any questions    What to do at Home:    *  Please give a list of your current medications to your Primary Care Provider. *  Please update this list whenever your medications are discontinued, doses are      changed, or new medications (including over-the-counter products) are added. *  Please carry medication information at all times in case of emergency situations. These are general instructions for a healthy lifestyle:    No smoking/ No tobacco products/ Avoid exposure to second hand smoke  Surgeon General's Warning:  Quitting smoking now greatly reduces serious risk to your health.     Obesity, smoking, and sedentary lifestyle greatly increases your risk for illness    A healthy diet, regular physical exercise & weight monitoring are important for maintaining a healthy lifestyle    You may be retaining fluid if you have a history of heart failure or if you experience any of the following symptoms:  Weight gain of 3 pounds or more overnight or 5 pounds in a week, increased swelling in our hands or feet or shortness of breath while lying flat in bed. Please call your doctor as soon as you notice any of these symptoms; do not wait until your next office visit. Recognize signs and symptoms of STROKE:    F-face looks uneven    A-arms unable to move or move unevenly    S-speech slurred or non-existent    T-time-call 911 as soon as signs and symptoms begin-DO NOT go       Back to bed or wait to see if you get better-TIME IS BRAIN. Warning Signs of HEART ATTACK     Call 911 if you have these symptoms:   Chest discomfort. Most heart attacks involve discomfort in the center of the chest that lasts more than a few minutes, or that goes away and comes back. It can feel like uncomfortable pressure, squeezing, fullness, or pain.  Discomfort in other areas of the upper body. Symptoms can include pain or discomfort in one or both arms, the back, neck, jaw, or stomach.  Shortness of breath with or without chest discomfort.  Other signs may include breaking out in a cold sweat, nausea, or lightheadedness. Don't wait more than five minutes to call 911 - MINUTES MATTER! Fast action can save your life. Calling 911 is almost always the fastest way to get lifesaving treatment. Emergency Medical Services staff can begin treatment when they arrive -- up to an hour sooner than if someone gets to the hospital by car. The discharge information has been reviewed with the patient. The patient verbalized understanding. Discharge medications reviewed with the patient and appropriate educational materials and side effects teaching were provided.   ___________________________________________________________________________________________________________________________________

## 2019-02-27 NOTE — PHYSICIAN ADVISORY
Letter of Determination: Upgrade from Observation to Inpatient Status This patient was originally hospitalized as Outpatient Status with Observation Services on 2/25/2019 for chest pain. This patient now meets for Inpatient Admission in accordance with CMS regulation Section 43 .3. Specifically, patient's stay is now over Two Midnights and was medically necessary. The patient's stay was medically necessary based on history of diabetes mellitus type 2, with medical plan for cardiac stress test.  Consistent with CMS guidelines, patient meets for inpatient status. It is our recommendation that this patient's hospitalization status should be upgraded from OBSERVATION to INPATIENT status.  
  
The final decision regarding the patient's hospitalization status depends on the attending physician's judgement. Sheba Foster MD, EHSAN, Physician Advisor [id number] [facility].

## 2019-02-27 NOTE — PROGRESS NOTES
Discharge instructions were reviewed with patient. An opportunity was given for questions. All medications were reviewed, and information was given on the new medications - protonix, aspirin, lotensin - HCT. Patient verbalized understanding, and has no questions at this time. IV and telemetry monitor removed by primary RN.

## 2019-02-27 NOTE — PROGRESS NOTES
Per Dr. Sara Karimi, if GI x-ray normal, may advance diet to cardiac.   Also, per Annita Espinosa NP, patient may eat post stress test.

## 2019-02-27 NOTE — PROGRESS NOTES
[facility] CARDIOLOGY PROGRESS NOTE 
      
 
2/27/2019 7:26 AM 
 
Admit Date: 2/25/2019 Subjective:  
Patient with mild 2/10 discomfort. Enzymes negatice  Nuke this AM.  
 
ROS: 
Cardiovascular:  As noted above Objective:  
  
Vitals:  
 02/26/19 1631 02/26/19 2122 02/27/19 0050 02/27/19 9241 BP: 167/83 149/73 143/74 158/78 Pulse: (!) 55 (!) 55 (!) 50 (!) 56 Resp: 18 18 18 18 Temp: 97.9 °F (36.6 °C) 97.9 °F (36.6 °C) 97.8 °F (36.6 °C) 97.7 °F (36.5 °C) SpO2: 96% 96% 97% 97% Weight:    80.9 kg (178 lb 4.8 oz) Height:      
 
 
Physical Exam: 
General-No Acute Distress Neck- supple, no JVD 
CV- regular rate and rhythm no MRG Lung- clear bilaterally Abd- soft, nontender, nondistended Ext- no edema bilaterally. Skin- warm and dry Data Review:  
Recent Labs  
  02/27/19 
0334 02/26/19 
3460  142  
K 3.5 3.4*  
BUN 12 15 CREA 1.21 1.18  
GLU 95 116* WBC 6.9 6.6 HGB 13.0* 13.0*  
HCT 41.4 40.6* * 139* TRIGL  --  123 HDL  --  40 Lab Results Component Value Date/Time TROIQ <0.02 (L) 02/26/2019 09:54 AM  
 
 
Assessment/Plan:  
 
Principal Problem: 
  Chest pain (2/25/2019) ? Etiology. Atypical for cardiac ischemia. Plan Nuke today. Active Problems: 
  HTN (hypertension) (2/25/2019) STable. Controlled type 2 diabetes mellitus without complication, without long-term current use of insulin ([address] (2/26/2019) Defer management to primary team.   
 
  Esophageal dysmotility (2/26/2019) GI workup ongoing.    
 
 
 
 
 
 
 
 
Alana Booth MD 
2/27/2019 7:26 AM

## 2019-02-27 NOTE — PROGRESS NOTES
GI DAILY PROGRESS NOTE Admit Date:  2/25/2019 Today's Date:  2/27/2019 CC:  Chest pain Subjective:  
 
Patient feeling better. Ate without pain. Had stress test. 
 
Medications:  
Current Facility-Administered Medications Medication Dose Route Frequency  pantoprazole (PROTONIX) 40 mg in sodium chloride 0.9% 10 mL injection  40 mg IntraVENous Q12H  
 sodium chloride (NS) flush 5-40 mL  5-40 mL IntraVENous Q8H  
 sodium chloride (NS) flush 5-40 mL  5-40 mL IntraVENous PRN  
 aspirin chewable tablet 81 mg  81 mg Oral DAILY  nitroglycerin (NITROSTAT) tablet 0.4 mg  0.4 mg SubLINGual Q5MIN PRN  
 morphine injection 2 mg  2 mg IntraVENous Q4H PRN  
 acetaminophen (TYLENOL) tablet 650 mg  650 mg Oral Q4H PRN  
 oxyCODONE-acetaminophen (PERCOCET 10)  mg per tablet 1 Tab  1 Tab Oral Q4H PRN  
 naloxone (NARCAN) injection 0.4 mg  0.4 mg IntraVENous PRN  
 enoxaparin (LOVENOX) injection 40 mg  40 mg SubCUTAneous Q24H  
 benazepril/hydroCHLOROthiazide (LOTENSIN HCT) 20/12.5 mg   Oral DAILY Review of Systems: A review of system was obtained, with pertinent positives as listed above. All others are negative. Objective:  
Vitals: 
Visit Vitals BP (!) 172/97 Comment: rn mak is aware Pulse (!) 53 Temp 98.1 °F (36.7 °C) Resp 18 Ht 5' 11\" (1.803 m) Wt 80.9 kg (178 lb 4.8 oz) SpO2 98% BMI 24.87 kg/m² Intake/Output: 
No intake/output data recorded. 02/25 1901 - 02/27 0700 In: 240 [P.O.:240] Out: 550 [Urine:550] Exam: 
General appearance: alert, cooperative, no distress Lungs: clear to auscultation bilaterally anteriorly Heart: regular rate and rhythm Abdomen: soft, non-tender. Bowel sounds normal. No masses,  no organomegaly Extremities: extremities normal, atraumatic, no cyanosis or edema Neuro:  Alert and oriented without obvious neurological deficits. Data Review (Labs):   
Recent Labs  
  02/27/19 
1541 02/26/19 
4394 WBC 6.9 6.6 HGB 13.0* 13.0*  
 HCT 41.4 40.6* * 139* MCV 92.2 91.0  142  
K 3.5 3.4*  
* 107 CO2 25 26 BUN 12 15 CREA 1.21 1.18  
CA 8.3 8.4 GLU 95 116* AP 71 90 SGOT 18 18 ALT 21 21 TBILI 0.4 0.4 Assessment:  
 
Principal Problem: 
  Chest pain (2/25/2019) Active Problems: 
  HTN (hypertension) (2/25/2019) Controlled type 2 diabetes mellitus without complication, without long-term current use of insulin ([facility] [address] (2/26/2019) Esophageal dysmotility (2/26/2019) Hypertension (2/27/2019) Plan:  
 
Continue BID PPI and we will arrange f/u outpt. He had a pneumatic dilation destroying the LES but had not been on daily PPI despite hx of erosive esophagitis befor the procedure done at [facility].  
Damian Thomas MD

## 2019-08-22 ENCOUNTER — HOSPITAL ENCOUNTER (OUTPATIENT)
Dept: MRI IMAGING | Age: 74
Discharge: HOME | End: 2019-08-22
Attending: PSYCHIATRY & NEUROLOGY
Payer: MEDICARE

## 2019-08-22 DIAGNOSIS — R13.12 OROPHARYNGEAL DYSPHAGIA: ICD-10-CM

## 2019-08-22 DIAGNOSIS — M54.12 CERVICAL RADICULOPATHY: ICD-10-CM

## 2019-08-22 PROCEDURE — 72141 MRI NECK SPINE W/O DYE: CPT

## 2019-08-22 PROCEDURE — 70551 MRI BRAIN STEM W/O DYE: CPT

## 2019-08-30 PROBLEM — E11.40 TYPE 2 DIABETES MELLITUS WITH DIABETIC NEUROPATHY (HCC): Status: ACTIVE | Noted: 2019-08-30

## 2021-08-03 PROBLEM — I10 HTN (HYPERTENSION): Status: RESOLVED | Noted: 2019-02-25 | Resolved: 2021-08-03

## 2021-08-30 ENCOUNTER — HOSPITAL ENCOUNTER (INPATIENT)
Age: 76
LOS: 3 days | Discharge: HOME | DRG: 369 | End: 2021-09-02
Attending: FAMILY MEDICINE | Admitting: FAMILY MEDICINE
Payer: MEDICARE

## 2021-08-30 ENCOUNTER — HOSPITAL ENCOUNTER (EMERGENCY)
Age: 76
Discharge: ANOTHER HOSPITAL | DRG: 369 | End: 2021-08-30
Attending: STUDENT IN AN ORGANIZED HEALTH CARE EDUCATION/TRAINING PROGRAM
Payer: MEDICARE

## 2021-08-30 VITALS
RESPIRATION RATE: 16 BRPM | DIASTOLIC BLOOD PRESSURE: 65 MMHG | HEIGHT: 71 IN | SYSTOLIC BLOOD PRESSURE: 135 MMHG | TEMPERATURE: 97.5 F | WEIGHT: 186 LBS | OXYGEN SATURATION: 100 % | HEART RATE: 105 BPM | BODY MASS INDEX: 26.04 KG/M2

## 2021-08-30 DIAGNOSIS — K92.2 UGIB (UPPER GASTROINTESTINAL BLEED): Primary | ICD-10-CM

## 2021-08-30 LAB
ABO + RH BLD: NORMAL
ALBUMIN SERPL-MCNC: 3.1 G/DL (ref 3.2–4.6)
ALBUMIN/GLOB SERPL: 1.2 {RATIO} (ref 1.2–3.5)
ALP SERPL-CCNC: 106 U/L (ref 50–136)
ALT SERPL-CCNC: 45 U/L (ref 12–65)
ANION GAP SERPL CALC-SCNC: 7 MMOL/L (ref 7–16)
ANION GAP SERPL CALC-SCNC: 9 MMOL/L (ref 7–16)
AST SERPL-CCNC: 23 U/L (ref 15–37)
BASOPHILS # BLD: 0 K/UL (ref 0–0.2)
BASOPHILS NFR BLD: 0 % (ref 0–2)
BILIRUB SERPL-MCNC: 0.6 MG/DL (ref 0.2–1.1)
BLOOD GROUP ANTIBODIES SERPL: NORMAL
BUN SERPL-MCNC: 76 MG/DL (ref 8–23)
BUN SERPL-MCNC: 77 MG/DL (ref 8–23)
CALCIUM SERPL-MCNC: 8.1 MG/DL (ref 8.3–10.4)
CALCIUM SERPL-MCNC: 8.3 MG/DL (ref 8.3–10.4)
CHLORIDE SERPL-SCNC: 113 MMOL/L (ref 98–107)
CHLORIDE SERPL-SCNC: 113 MMOL/L (ref 98–107)
CO2 SERPL-SCNC: 20 MMOL/L (ref 21–32)
CO2 SERPL-SCNC: 22 MMOL/L (ref 21–32)
CREAT SERPL-MCNC: 1.37 MG/DL (ref 0.8–1.5)
CREAT SERPL-MCNC: 1.4 MG/DL (ref 0.8–1.5)
DIFFERENTIAL METHOD BLD: ABNORMAL
EOSINOPHIL # BLD: 0 K/UL (ref 0–0.8)
EOSINOPHIL NFR BLD: 0 % (ref 0.5–7.8)
ERYTHROCYTE [DISTWIDTH] IN BLOOD BY AUTOMATED COUNT: 14 % (ref 11.9–14.6)
GLOBULIN SER CALC-MCNC: 2.5 G/DL (ref 2.3–3.5)
GLUCOSE SERPL-MCNC: 171 MG/DL (ref 65–100)
GLUCOSE SERPL-MCNC: 175 MG/DL (ref 65–100)
HCT VFR BLD AUTO: 24.9 % (ref 41.1–50.3)
HGB BLD-MCNC: 7.6 G/DL (ref 13.6–17.2)
IMM GRANULOCYTES # BLD AUTO: 0.1 K/UL (ref 0–0.5)
IMM GRANULOCYTES NFR BLD AUTO: 1 % (ref 0–5)
INR PPP: 1.3
LYMPHOCYTES # BLD: 1.2 K/UL (ref 0.5–4.6)
LYMPHOCYTES NFR BLD: 13 % (ref 13–44)
MCH RBC QN AUTO: 29.7 PG (ref 26.1–32.9)
MCHC RBC AUTO-ENTMCNC: 30.5 G/DL (ref 31.4–35)
MCV RBC AUTO: 97.3 FL (ref 79.6–97.8)
MONOCYTES # BLD: 0.6 K/UL (ref 0.1–1.3)
MONOCYTES NFR BLD: 7 % (ref 4–12)
NEUTS SEG # BLD: 7.3 K/UL (ref 1.7–8.2)
NEUTS SEG NFR BLD: 79 % (ref 43–78)
NRBC # BLD: 0 K/UL (ref 0–0.2)
PLATELET # BLD AUTO: 168 K/UL (ref 150–450)
PMV BLD AUTO: 12.3 FL (ref 9.4–12.3)
POTASSIUM SERPL-SCNC: 4.5 MMOL/L (ref 3.5–5.1)
POTASSIUM SERPL-SCNC: 5.6 MMOL/L (ref 3.5–5.1)
PROT SERPL-MCNC: 5.6 G/DL (ref 6.3–8.2)
PROTHROMBIN TIME: 16.6 SEC (ref 12.6–14.5)
RBC # BLD AUTO: 2.56 M/UL (ref 4.23–5.6)
SODIUM SERPL-SCNC: 142 MMOL/L (ref 138–145)
SODIUM SERPL-SCNC: 142 MMOL/L (ref 138–145)
SPECIMEN EXP DATE BLD: NORMAL
WBC # BLD AUTO: 9.3 K/UL (ref 4.3–11.1)

## 2021-08-30 PROCEDURE — 74011250636 HC RX REV CODE- 250/636: Performed by: STUDENT IN AN ORGANIZED HEALTH CARE EDUCATION/TRAINING PROGRAM

## 2021-08-30 PROCEDURE — 74011250636 HC RX REV CODE- 250/636: Performed by: FAMILY MEDICINE

## 2021-08-30 PROCEDURE — 99284 EMERGENCY DEPT VISIT MOD MDM: CPT

## 2021-08-30 PROCEDURE — 65270000029 HC RM PRIVATE

## 2021-08-30 PROCEDURE — 86850 RBC ANTIBODY SCREEN: CPT

## 2021-08-30 PROCEDURE — 80053 COMPREHEN METABOLIC PANEL: CPT

## 2021-08-30 PROCEDURE — 85610 PROTHROMBIN TIME: CPT

## 2021-08-30 PROCEDURE — 96360 HYDRATION IV INFUSION INIT: CPT

## 2021-08-30 PROCEDURE — 85025 COMPLETE CBC W/AUTO DIFF WBC: CPT

## 2021-08-30 RX ORDER — SODIUM CHLORIDE 0.9 % (FLUSH) 0.9 %
5-40 SYRINGE (ML) INJECTION AS NEEDED
Status: DISCONTINUED | OUTPATIENT
Start: 2021-08-30 | End: 2021-09-02 | Stop reason: HOSPADM

## 2021-08-30 RX ORDER — POLYETHYLENE GLYCOL 3350 17 G/17G
17 POWDER, FOR SOLUTION ORAL DAILY PRN
Status: DISCONTINUED | OUTPATIENT
Start: 2021-08-30 | End: 2021-09-02 | Stop reason: HOSPADM

## 2021-08-30 RX ORDER — LEVOTHYROXINE SODIUM 50 UG/1
25 TABLET ORAL
Status: DISCONTINUED | OUTPATIENT
Start: 2021-08-31 | End: 2021-08-30 | Stop reason: DRUGHIGH

## 2021-08-30 RX ORDER — SODIUM CHLORIDE 0.9 % (FLUSH) 0.9 %
5-10 SYRINGE (ML) INJECTION EVERY 8 HOURS
Status: DISCONTINUED | OUTPATIENT
Start: 2021-08-30 | End: 2021-08-30 | Stop reason: HOSPADM

## 2021-08-30 RX ORDER — ONDANSETRON 2 MG/ML
4 INJECTION INTRAMUSCULAR; INTRAVENOUS
Status: DISCONTINUED | OUTPATIENT
Start: 2021-08-30 | End: 2021-09-02 | Stop reason: HOSPADM

## 2021-08-30 RX ORDER — ACETAMINOPHEN 650 MG/1
650 SUPPOSITORY RECTAL
Status: DISCONTINUED | OUTPATIENT
Start: 2021-08-30 | End: 2021-09-02 | Stop reason: HOSPADM

## 2021-08-30 RX ORDER — SODIUM CHLORIDE 0.9 % (FLUSH) 0.9 %
5-10 SYRINGE (ML) INJECTION AS NEEDED
Status: DISCONTINUED | OUTPATIENT
Start: 2021-08-30 | End: 2021-08-30 | Stop reason: HOSPADM

## 2021-08-30 RX ORDER — ONDANSETRON 4 MG/1
4 TABLET, ORALLY DISINTEGRATING ORAL
Status: DISCONTINUED | OUTPATIENT
Start: 2021-08-30 | End: 2021-09-02 | Stop reason: HOSPADM

## 2021-08-30 RX ORDER — ACETAMINOPHEN 325 MG/1
650 TABLET ORAL
Status: DISCONTINUED | OUTPATIENT
Start: 2021-08-30 | End: 2021-09-02 | Stop reason: HOSPADM

## 2021-08-30 RX ORDER — SODIUM CHLORIDE 9 MG/ML
75 INJECTION, SOLUTION INTRAVENOUS CONTINUOUS
Status: DISCONTINUED | OUTPATIENT
Start: 2021-08-30 | End: 2021-09-01

## 2021-08-30 RX ORDER — SODIUM CHLORIDE 0.9 % (FLUSH) 0.9 %
5-40 SYRINGE (ML) INJECTION EVERY 8 HOURS
Status: CANCELLED | OUTPATIENT
Start: 2021-08-30

## 2021-08-30 RX ORDER — LEVOTHYROXINE SODIUM 88 UG/1
88 TABLET ORAL
Status: DISCONTINUED | OUTPATIENT
Start: 2021-08-31 | End: 2021-09-02 | Stop reason: HOSPADM

## 2021-08-30 RX ADMIN — SODIUM CHLORIDE 1000 ML: 900 INJECTION, SOLUTION INTRAVENOUS at 16:24

## 2021-08-30 RX ADMIN — SODIUM CHLORIDE 150 ML/HR: 900 INJECTION, SOLUTION INTRAVENOUS at 22:40

## 2021-08-30 NOTE — ED PROVIDER NOTES
71-year-old male presents to the emergency department transferred from local urgent care. Patient presents complaining of multiple episodes of vomiting of blood which began yesterday as well as melena which began today. Patient denies history of GI bleeds. Does have history of achalasia and reports having to gag himself daily. He reports no history of noting blood within his vomitus previously. Reports generalized weakness. Denies chest pain or abdominal pain. Is on Brilinta. Denies any other blood thinner usage. Patient states he sees gastroenterology at [facility], has a ear nose and throat specialist here at [facility], Dr. Anu Reynolds. Prior to being transferred from the urgent care patient was given 80 mg IV Protonix. Patient also had a Hemoccult that was positive with melanotic stool. Past Medical History:   Diagnosis Date    Achalasia     Diabetes mellitus ([address]     Hypertension     Intractable hiccups        No past surgical history on file. History reviewed. No pertinent family history. Social History     Socioeconomic History    Marital status: SINGLE     Spouse name: Not on file    Number of children: Not on file    Years of education: Not on file    Highest education level: Not on file   Occupational History    Not on file   Tobacco Use    Smoking status: Not on file   Substance and Sexual Activity    Alcohol use: Not on file    Drug use: Not on file    Sexual activity: Not on file   Other Topics Concern    Not on file   Social History Narrative    Not on file     Social Determinants of Health     Financial Resource Strain:     Difficulty of Paying Living Expenses:    Food Insecurity:     Worried About Running Out of Food in the Last Year:     [address] in the Last Year:    Transportation Needs:     Lack of Transportation (Medical):      Lack of Transportation (Non-Medical):    Physical Activity:     Days of Exercise per Week:     Minutes of Exercise per Session:    Stress:     Feeling of Stress :    Social Connections:     Frequency of Communication with Friends and Family:     Frequency of Social Gatherings with Friends and Family:     Attends [employer] Services:     Active Member of Clubs or Organizations:     Attends Club or Organization Meetings:     Marital Status:    Intimate Partner Violence:     Fear of Current or Ex-Partner:     Emotionally Abused:     Physically Abused:     Sexually Abused: ALLERGIES: Baclofen    Review of Systems   Constitutional: Negative for chills and fever. HENT: Negative for congestion and sore throat. Eyes: Negative for visual disturbance. Respiratory: Negative for cough and shortness of breath. Cardiovascular: Negative for chest pain. Gastrointestinal: Positive for blood in stool. Negative for abdominal pain, diarrhea, nausea and vomiting. Endocrine: Negative for polyuria. Genitourinary: Negative for difficulty urinating and dysuria. Musculoskeletal: Negative for neck pain and neck stiffness. Skin: Negative for rash. Neurological: Negative for weakness and headaches. All other systems reviewed and are negative. Vitals:    08/30/21 1621   BP: 94/68   Pulse: (!) 105   Resp: 16   Temp: 97.5 °F (36.4 °C)   SpO2: 99%   Weight: 84.4 kg (186 lb)   Height: 5' 11\" (1.803 m)            Physical Exam  Vitals and nursing note reviewed. Constitutional:       Appearance: Normal appearance. HENT:      Head: Normocephalic and atraumatic. Nose: Nose normal.      Mouth/Throat:      Mouth: Mucous membranes are moist.   Eyes:      Extraocular Movements: Extraocular movements intact. Cardiovascular:      Rate and Rhythm: Normal rate and regular rhythm. Heart sounds: Normal heart sounds. Pulmonary:      Effort: Pulmonary effort is normal.      Breath sounds: Normal breath sounds. No wheezing, rhonchi or rales. Abdominal:      General: Abdomen is flat.       Palpations: Abdomen is soft. Tenderness: There is no abdominal tenderness. There is no guarding. Musculoskeletal:         General: Normal range of motion. Cervical back: Normal range of motion. Skin:     General: Skin is warm and dry. Neurological:      General: No focal deficit present. Mental Status: He is alert and oriented to person, place, and time. Psychiatric:         Mood and Affect: Mood normal.          MDM  Number of Diagnoses or Management Options  UGIB (upper gastrointestinal bleed)  Diagnosis management comments: 44-year-old male presents the emergency department after being transferred here from local urgent care. At the local urgent care, patient had received 80 mg of IV Protonix, had a Hemoccult which was positive with melanotic stool. Patient is on Brilinta. Patient with borderline blood pressure, 94/68, heart rate 105, will give 1 L bolus IV fluid. Blood pressure improved to the 120s after IV fluid. Patient's labs show white count 9.3, hemoglobin 7.6, baseline appears to be 13, Hemolyzed CMP but did show BUN of 76 with GFR of 54. Gastroenterology was made aware of patient given his presentation and upper GI bleed. I then spoke with the hospitalist who agreed admit this patient for continued evaluation and treatment. Patient voiced understanding and agreement.        Amount and/or Complexity of Data Reviewed  Clinical lab tests: reviewed and ordered  Discussion of test results with the performing providers: yes  Discuss the patient with other providers: yes    Risk of Complications, Morbidity, and/or Mortality  Presenting problems: moderate  Diagnostic procedures: moderate  Management options: moderate           Procedures

## 2021-08-30 NOTE — ED TRIAGE NOTES
Pt arrives via EMS from emergency MD. Reports fatigue with coffee ground emesis for 2 days. Pt denies being on any blood thinners.  EMS reports VSS

## 2021-08-31 ENCOUNTER — ANESTHESIA EVENT (OUTPATIENT)
Dept: ENDOSCOPY | Age: 76
DRG: 369 | End: 2021-08-31
Payer: MEDICARE

## 2021-08-31 ENCOUNTER — ANESTHESIA (OUTPATIENT)
Dept: ENDOSCOPY | Age: 76
DRG: 369 | End: 2021-08-31
Payer: MEDICARE

## 2021-08-31 PROBLEM — K92.2 GI BLEED: Status: ACTIVE | Noted: 2021-08-31

## 2021-08-31 LAB
ANION GAP SERPL CALC-SCNC: 5 MMOL/L (ref 7–16)
BUN SERPL-MCNC: 80 MG/DL (ref 8–23)
CALCIUM SERPL-MCNC: 8 MG/DL (ref 8.3–10.4)
CHLORIDE SERPL-SCNC: 116 MMOL/L (ref 98–107)
CO2 SERPL-SCNC: 23 MMOL/L (ref 21–32)
COVID-19 RAPID TEST, COVR: NOT DETECTED
CREAT SERPL-MCNC: 1.48 MG/DL (ref 0.8–1.5)
GLUCOSE BLD STRIP.AUTO-MCNC: 121 MG/DL (ref 65–100)
GLUCOSE SERPL-MCNC: 145 MG/DL (ref 65–100)
HCT VFR BLD AUTO: 21.2 % (ref 41.1–50.3)
HCT VFR BLD AUTO: 23 % (ref 41.1–50.3)
HCT VFR BLD AUTO: 24 % (ref 41.1–50.3)
HCT VFR BLD AUTO: 25.2 % (ref 41.1–50.3)
HGB BLD-MCNC: 6.9 G/DL (ref 13.6–17.2)
HGB BLD-MCNC: 7.3 G/DL (ref 13.6–17.2)
HGB BLD-MCNC: 7.8 G/DL (ref 13.6–17.2)
HGB BLD-MCNC: 7.9 G/DL (ref 13.6–17.2)
HISTORY CHECKED?,CKHIST: NORMAL
POTASSIUM SERPL-SCNC: 4 MMOL/L (ref 3.5–5.1)
SERVICE CMNT-IMP: ABNORMAL
SODIUM SERPL-SCNC: 144 MMOL/L (ref 136–145)
SOURCE, COVRS: NORMAL

## 2021-08-31 PROCEDURE — 88305 TISSUE EXAM BY PATHOLOGIST: CPT

## 2021-08-31 PROCEDURE — 2709999900 HC NON-CHARGEABLE SUPPLY

## 2021-08-31 PROCEDURE — 74011000250 HC RX REV CODE- 250: Performed by: NURSE ANESTHETIST, CERTIFIED REGISTERED

## 2021-08-31 PROCEDURE — 36415 COLL VENOUS BLD VENIPUNCTURE: CPT

## 2021-08-31 PROCEDURE — 87635 SARS-COV-2 COVID-19 AMP PRB: CPT

## 2021-08-31 PROCEDURE — 86923 COMPATIBILITY TEST ELECTRIC: CPT

## 2021-08-31 PROCEDURE — 74011250637 HC RX REV CODE- 250/637: Performed by: FAMILY MEDICINE

## 2021-08-31 PROCEDURE — 77030021593 HC FCPS BIOP ENDOSC BSC -A: Performed by: INTERNAL MEDICINE

## 2021-08-31 PROCEDURE — 85014 HEMATOCRIT: CPT

## 2021-08-31 PROCEDURE — 86901 BLOOD TYPING SEROLOGIC RH(D): CPT

## 2021-08-31 PROCEDURE — 74011000258 HC RX REV CODE- 258: Performed by: INTERNAL MEDICINE

## 2021-08-31 PROCEDURE — 30230N1 TRANSFUSION OF NONAUTOLOGOUS RED BLOOD CELLS INTO PERIPHERAL VEIN, OPEN APPROACH: ICD-10-PCS | Performed by: HOSPITALIST

## 2021-08-31 PROCEDURE — 76040000025: Performed by: INTERNAL MEDICINE

## 2021-08-31 PROCEDURE — 2709999900 HC NON-CHARGEABLE SUPPLY: Performed by: INTERNAL MEDICINE

## 2021-08-31 PROCEDURE — 82962 GLUCOSE BLOOD TEST: CPT

## 2021-08-31 PROCEDURE — 76060000032 HC ANESTHESIA 0.5 TO 1 HR: Performed by: INTERNAL MEDICINE

## 2021-08-31 PROCEDURE — 77030040361 HC SLV COMPR DVT MDII -B

## 2021-08-31 PROCEDURE — 65270000029 HC RM PRIVATE

## 2021-08-31 PROCEDURE — 80048 BASIC METABOLIC PNL TOTAL CA: CPT

## 2021-08-31 PROCEDURE — 74011250636 HC RX REV CODE- 250/636: Performed by: INTERNAL MEDICINE

## 2021-08-31 PROCEDURE — P9016 RBC LEUKOCYTES REDUCED: HCPCS

## 2021-08-31 PROCEDURE — 0DB58ZX EXCISION OF ESOPHAGUS, VIA NATURAL OR ARTIFICIAL OPENING ENDOSCOPIC, DIAGNOSTIC: ICD-10-PCS | Performed by: INTERNAL MEDICINE

## 2021-08-31 PROCEDURE — 74011250636 HC RX REV CODE- 250/636: Performed by: FAMILY MEDICINE

## 2021-08-31 PROCEDURE — 36430 TRANSFUSION BLD/BLD COMPNT: CPT

## 2021-08-31 PROCEDURE — 74011250636 HC RX REV CODE- 250/636: Performed by: NURSE ANESTHETIST, CERTIFIED REGISTERED

## 2021-08-31 PROCEDURE — C9113 INJ PANTOPRAZOLE SODIUM, VIA: HCPCS | Performed by: INTERNAL MEDICINE

## 2021-08-31 RX ORDER — SUCCINYLCHOLINE CHLORIDE 20 MG/ML
INJECTION INTRAMUSCULAR; INTRAVENOUS AS NEEDED
Status: DISCONTINUED | OUTPATIENT
Start: 2021-08-31 | End: 2021-08-31 | Stop reason: HOSPADM

## 2021-08-31 RX ORDER — SODIUM CHLORIDE, SODIUM LACTATE, POTASSIUM CHLORIDE, CALCIUM CHLORIDE 600; 310; 30; 20 MG/100ML; MG/100ML; MG/100ML; MG/100ML
INJECTION, SOLUTION INTRAVENOUS
Status: DISCONTINUED | OUTPATIENT
Start: 2021-08-31 | End: 2021-08-31 | Stop reason: HOSPADM

## 2021-08-31 RX ORDER — PROPOFOL 10 MG/ML
INJECTION, EMULSION INTRAVENOUS AS NEEDED
Status: DISCONTINUED | OUTPATIENT
Start: 2021-08-31 | End: 2021-08-31 | Stop reason: HOSPADM

## 2021-08-31 RX ORDER — SODIUM CHLORIDE 9 MG/ML
250 INJECTION, SOLUTION INTRAVENOUS AS NEEDED
Status: DISCONTINUED | OUTPATIENT
Start: 2021-08-31 | End: 2021-09-02 | Stop reason: HOSPADM

## 2021-08-31 RX ORDER — LIDOCAINE HYDROCHLORIDE 20 MG/ML
INJECTION, SOLUTION EPIDURAL; INFILTRATION; INTRACAUDAL; PERINEURAL AS NEEDED
Status: DISCONTINUED | OUTPATIENT
Start: 2021-08-31 | End: 2021-08-31 | Stop reason: HOSPADM

## 2021-08-31 RX ADMIN — SODIUM CHLORIDE 8 MG/HR: 9 INJECTION, SOLUTION INTRAVENOUS at 00:22

## 2021-08-31 RX ADMIN — PROPOFOL 150 MG: 10 INJECTION, EMULSION INTRAVENOUS at 11:40

## 2021-08-31 RX ADMIN — SODIUM CHLORIDE 150 ML/HR: 900 INJECTION, SOLUTION INTRAVENOUS at 06:11

## 2021-08-31 RX ADMIN — SODIUM CHLORIDE 150 ML/HR: 900 INJECTION, SOLUTION INTRAVENOUS at 20:25

## 2021-08-31 RX ADMIN — SODIUM CHLORIDE, SODIUM LACTATE, POTASSIUM CHLORIDE, AND CALCIUM CHLORIDE: 600; 310; 30; 20 INJECTION, SOLUTION INTRAVENOUS at 11:36

## 2021-08-31 RX ADMIN — LEVOTHYROXINE SODIUM 88 MCG: 0.09 TABLET ORAL at 08:39

## 2021-08-31 RX ADMIN — SUCCINYLCHOLINE CHLORIDE 160 MG: 20 INJECTION, SOLUTION INTRAMUSCULAR; INTRAVENOUS at 11:40

## 2021-08-31 RX ADMIN — LIDOCAINE HYDROCHLORIDE 60 MG: 20 INJECTION, SOLUTION EPIDURAL; INFILTRATION; INTRACAUDAL; PERINEURAL at 11:40

## 2021-08-31 NOTE — PROGRESS NOTES
[facility] Hospitalist Service Progress Note    Interval History:  Heike Li is a 76year old CM with a PMH GERD, DMII, HTN, MARILIN, CAD, achalasia who presented to the ER with a complaint of hematemesis. Patient reports he has had difficulty swallowing for many years and has periodic blood tinged sputum. Patient reports he woke yesterday and had what he felt was a lot of mucus in his throat. When he began to cough he noticed a moderate amount of \"medium\" colored blood with clots that continued to be intermittent, prompting ER visit. He also endorsed and episode of \"black diarrhea\". Denies taking iron supplements/NSAIDs, currently on Brillinta. Had colonoscopy 3-4 years ago with benign polyps. Denies abdominal pain, N/V, dizziness, chills    Assessment / Plan:  GI Bleed  Hold Brillinta; NPO except meds; Serial H&H/transfuse 2 U today for Hgb 6.9; IV Protonix  GI following with possible EGD today. Appreciate assistance. Patient will need OP colonoscopy after 10/2021 for surveillance of polyps. Exam was due 9/2020    Achalasia   EGD in today    Anemia  Hgb 6.9 Normocytic anemia due to GI bleed; Transfuse 2 U PRBC; See above     DMII  AC/HS Accuchecks with SSI    GERD  IV Protonix     Hypertension  Stable BP; reintroduce home meds prn    CAD  Continue home medications; Patient denies CP    Chief Complaint : No chief complaint on file. Subjective:  Patient has c/o small amount of bloody sputum and x 1 episode of melena.  Denies abdominal pain, fatigue, dizziness, N/V. Hgb noted to be 6.9 today     No acute events overnight, patient has no new complaints today,  Cardiovascular, respiratory, GI review of system negative except mentioned above  Objective:  Visit Vitals  BP (!) 114/57   Pulse 65   Temp 98 °F (36.7 °C)   Resp 16   SpO2 99%                 Physical Exam:  General: No acute distress, speaking in full sentences, no use of accessory muscles   HEENT: Pupils equal; oropharynx is clear   Neck: no JVD   Lungs: Clear to auscultation bilaterally   Cardiovascular: Regular rate and rhythm with normal S1 and S2   Abdomen: Soft, nontender, nondistended, normoactive bowel sounds   Extremities: No cyanosis clubbing or edema   Neuro: Nonfocal, A&O x3   Psych: Normal affect     Intake and Output:  Date 08/30/21 0700 - 08/31/21 0659 08/31/21 0700 - 09/01/21 0659   Shift 5817-5741 9679-8844 24 Hour Total 7319-1927 7509-3997 24 Hour Total   INTAKE   Blood    292.7  292.7     Volume (TRANSFUSE PACKED RBC'S)    292.7  292.7   Shift Total    292.7  292.7   OUTPUT   Shift Total         NET    292.7  292.7   Weight (kg)             LAB:  Admission on 08/30/2021   Component Date Value    HGB 08/31/2021 7.3*    HCT 08/31/2021 23.0*    Sodium 08/31/2021 144     Potassium 08/31/2021 4.0     Chloride 08/31/2021 116*    CO2 08/31/2021 23     Anion gap 08/31/2021 5*    Glucose 08/31/2021 145*    BUN 08/31/2021 80*    Creatinine 08/31/2021 1.48     GFR est AA 08/31/2021 60*    GFR est non-AA 08/31/2021 49*    Calcium 08/31/2021 8.0*    HGB 08/31/2021 6.9*    HCT 08/31/2021 21.2*    Crossmatch Expiration 08/31/2021 09/03/2021,2359     ABO/Rh(D) 08/31/2021 O POSITIVE     Antibody screen 08/31/2021 NEG     Unit number 08/31/2021 [id number]     Blood component type 08/31/2021 RC LR     Unit division 08/31/2021 00     Status of unit 08/31/2021 ALLOCATED     Crossmatch result 08/31/2021 Compatible     Unit number 08/31/2021 [id number]     Blood component type 08/31/2021 RC LR,2     Unit division 08/31/2021 00     Status of unit 08/31/2021 ISSUED     Crossmatch result 08/31/2021 Compatible     HISTORY CHECKED? 08/31/2021 Historical check performed        IMAGING:  No results found.     EKG:  EKG Results     None            Aure Brannon, RAJWINDER  8/31/2021 11:03 AM

## 2021-08-31 NOTE — ANESTHESIA POSTPROCEDURE EVALUATION
Procedure(s):  ESOPHAGOGASTRODUODENOSCOPY (EGD)  ESOPHAGOGASTRODUODENAL (EGD) BIOPSY.     general    Anesthesia Post Evaluation        Patient location during evaluation: PACU  Patient participation: complete - patient participated  Level of consciousness: awake and alert  Pain management: adequate  Airway patency: patent  Anesthetic complications: no  Cardiovascular status: acceptable  Respiratory status: acceptable  Hydration status: acceptable  Post anesthesia nausea and vomiting:  controlled  Final Post Anesthesia Temperature Assessment:  Normothermia (36.0-37.5 degrees C)      INITIAL Post-op Vital signs:   Vitals Value Taken Time   /66 08/31/21 1230   Temp 36.6 °C (97.8 °F) 08/31/21 1212   Pulse 67 08/31/21 1230   Resp 18 08/31/21 1230   SpO2 99 % 08/31/21 1230

## 2021-08-31 NOTE — ASSESSMENT & PLAN NOTE
Given hematemesis and melena, likely upper.    - Hold Brilinta  - NPO except sips with meds  - H/H q6h  - IV protonix BID  - Consult with GI, appreciate Dr. Eva Russell

## 2021-08-31 NOTE — PROGRESS NOTES
Mr. Lindsey Kirk is a 49-year-old gentleman with appreciable past medical history significant for achalasia, history of gastritis with upper GI bleed in the past. On Brilinta. His has hemodynamically stabilized while here and seems to be a decent candidate for a noncovered admission to [facility] due to status here. I discussed this patient with Dr. Hang Savage, GI attending, who agreed with plan. I discussed this patient with Dr. Nimo Melendez, accepting attending at [address], who agreed with this plan. I discussed with both nursing supervisor is here [address] and a bed is being obtained for plan to transfer.

## 2021-08-31 NOTE — ED NOTES
TRANSFER - OUT REPORT:    Verbal report given to Columba(name) on Deann Dunn  being transferred to [facility] at [facility]) for routine progression of care       Report consisted of patients Situation, Background, Assessment and   Recommendations(SBAR). Information from the following report(s) SBAR, ED Summary, [facility] and Recent Results was reviewed with the receiving nurse. Lines:   Peripheral IV 08/30/21 Right Antecubital (Active)   Site Assessment Clean, dry, & intact 08/30/21 1624   Phlebitis Assessment 0 08/30/21 1624   Infiltration Assessment 0 08/30/21 1624   Dressing Status Clean, dry, & intact 08/30/21 1624        Opportunity for questions and clarification was provided.       Patient transported with:   Serious Parody

## 2021-08-31 NOTE — H&P
Hospitalist History and Physical   Admit Date:  2021 10:46 PM   Name:  Chadwick Sarmiento   Age:  76 y.o. Sex:  male  :  1945   MRN:  [id number]     Presenting Complaint: hematemesis  Reason(s) for Admission: GI bleed [K92.2]     History of Present Illness:   Chadwick Sarmiento is a 76 y.o. male with medical history of GERD, DM2, HTN, MARILIN, CAD on Brilinta who presented to ED with report of vomiting blood. Patient reports that this started yesterday. He has experienced several episodes, and reports that earlier today, he noticed his stool was dark. On initial ER presentation, patient was borderline hypotensive with BP of 94/68 which improved after IVFs. Hgb 7.6, with baseline ~13. Hemoccult positive. Dr. Chelsea House of GI was consulted. Hospitalist asked to admit. Due to lack of beds at [facility] and stability of patient, case with discussed further between Dr. Chelsea House and  hospitalist, and decision was made to directly admit patient to [facility] for continued care. Review of Systems:  10 systems reviewed and negative except as noted in HPI. Assessment & Plan:   * GI bleed  Given hematemesis and melena, likely upper. - Hold Brilinta  - NPO except sips with meds  - H/H q6h  - IV protonix BID  - Consult with GI, appreciate Dr. Chelsea House    Hypertension  BP was borderline hypotensive upon ER evalution.  - Holding home BP meds for time being    Esophageal dysmotility  Of note, patient has h/o achalasia with intermittent gagging    Controlled type 2 diabetes mellitus without complication, without long-term current use of insulin ([address]  - Hold home metformin  - Diabetic diet once able to advance diet       Disposition: inpatient    Diet: DIET NPO Sips of Water with Meds, Ice Chips, Sips of Clear Liquids  VTE ppx: SCDs  Code status: Full Code      Past medical history reviewed.     Past Medical History:   Diagnosis Date    Achalasia     Diabetes mellitus ([address]     Hypertension     Intractable hiccups Past surgical history reviewed. No past surgical history on file. Allergies   Allergen Reactions    Baclofen Other (comments)     HALLUCINATIONS      Social History     Tobacco Use    Smoking status: Not on file   Substance Use Topics    Alcohol use: Not on file      No family history on file. Family history reviewed and noncontributory to patient's acute condition; no relevant family history unless otherwise noted above. There is no immunization history on file for this patient. PTA Medications:  Current Outpatient Medications   Medication Instructions    ascorbic acid (VITAMIN C PO) Oral    benazepril-hydroCHLOROthiazide (LOTENSIN HCT) 20-12.5 mg per tablet 1 Tablet, Oral, DAILY    carvedilol (COREG) 12.5 mg tablet Oral, 2 TIMES DAILY WITH MEALS    cholecalciferol, vitamin D3, (VITAMIN D3 PO) Oral, 5,00O international     co-enzyme Q-10 (CO Q-10) 100 mg, Oral, DAILY    diazePAM (VALIUM) 5 mg tablet Take 1 hour before MRI. If needed, may repeat dose.  gabapentin (NEURONTIN) 300 mg, Oral, 3 TIMES DAILY AS NEEDED    HYDROcodone-acetaminophen (NORCO) 7.5-325 mg per tablet Oral, Prn     levothyroxine (SYNTHROID) 88 mcg, Oral, DAILY BEFORE BREAKFAST    metFORMIN (GLUCOPHAGE) 500 mg, Oral, DAILY WITH BREAKFAST    OTHER Norxhig28 ml once daily     testosterone cypionate (DEPOTESTOTERONE CYPIONATE) 200 mg/mL injection IntraMUSCular, ONCE, .6ml q 2 weeks     vitamin E acetate (VITAMIN E PO) Oral       Objective:     Patient Vitals for the past 24 hrs:   Temp Pulse Resp BP SpO2   08/30/21 2240 99 °F (37.2 °C) 98 16 124/77 98 %     Oxygen Therapy  O2 Sat (%): 98 % (08/30/21 2240)    Estimated body mass index is 25.94 kg/m² as calculated from the following:    Height as of an earlier encounter on 8/30/21: 5' 11\" (1.803 m). Weight as of an earlier encounter on 8/30/21: 84.4 kg (186 lb).   No intake or output data in the 24 hours ending 08/31/21 0037      Physical Exam:  General:    Well nourished. No overt distress  Head:  Normocephalic, atraumatic  Eyes:  Sclerae appear normal.  Pupils equally round. HENT:  Nares appear normal, no drainage. Moist mucous membranes  Neck:  No restricted ROM. Trachea midline  CV:   RRR. S1/S2 auscultated  Lungs:   CTAB. No wheezing, rhonchi, or rales. Appears even, unlabored  Abdomen: Bowel sounds present. Soft, nontender, nondistended. Extremities: Warm and dry. No cyanosis or clubbing. No edema. Skin:     No rashes. Normal turgor. Normal coloration  Neuro:  Cranial nerves II-XII grossly intact. Sensation intact  Psych:  Normal mood and affect. Alert and oriented x3    Data Ordered and Personally Reviewed:    Last 24hr Labs:  Recent Results (from the past 24 hour(s))   TYPE & SCREEN    Collection Time: 08/30/21  4:25 PM   Result Value Ref Range    Crossmatch Expiration 09/02/2021,2359     ABO/Rh(D) Shaka Rachelle POSITIVE     Antibody screen NEG    METABOLIC PANEL, COMPREHENSIVE    Collection Time: 08/30/21  4:25 PM   Result Value Ref Range    Sodium 142 138 - 145 mmol/L    Potassium 5.6 (H) 3.5 - 5.1 mmol/L    Chloride 113 (H) 98 - 107 mmol/L    CO2 22 21 - 32 mmol/L    Anion gap 7 7 - 16 mmol/L    Glucose 175 (H) 65 - 100 mg/dL    BUN 76 (H) 8 - 23 MG/DL    Creatinine 1.37 0.8 - 1.5 MG/DL    GFR est AA >60 >60 ml/min/1.73m2    GFR est non-AA 54 (L) >60 ml/min/1.73m2    Calcium 8.3 8.3 - 10.4 MG/DL    Bilirubin, total 0.6 0.2 - 1.1 MG/DL    ALT (SGPT) 45 12 - 65 U/L    AST (SGOT) 23 15 - 37 U/L    Alk.  phosphatase 106 50 - 136 U/L    Protein, total 5.6 (L) 6.3 - 8.2 g/dL    Albumin 3.1 (L) 3.2 - 4.6 g/dL    Globulin 2.5 2.3 - 3.5 g/dL    A-G Ratio 1.2 1.2 - 3.5     PROTHROMBIN TIME + INR    Collection Time: 08/30/21  4:25 PM   Result Value Ref Range    Prothrombin time 16.6 (H) 12.6 - 14.5 sec    INR 1.3     CBC WITH AUTOMATED DIFF    Collection Time: 08/30/21  6:20 PM   Result Value Ref Range    WBC 9.3 4.3 - 11.1 K/uL    RBC 2.56 (L) 4.23 - 5.6 M/uL HGB 7.6 (L) 13.6 - 17.2 g/dL    HCT 24.9 (L) 41.1 - 50.3 %    MCV 97.3 79.6 - 97.8 FL    MCH 29.7 26.1 - 32.9 PG    MCHC 30.5 (L) 31.4 - 35.0 g/dL    RDW 14.0 11.9 - 14.6 %    PLATELET 651 023 - 555 K/uL    MPV 12.3 9.4 - 12.3 FL    ABSOLUTE NRBC 0.00 0.0 - 0.2 K/uL    DF AUTOMATED      NEUTROPHILS 79 (H) 43 - 78 %    LYMPHOCYTES 13 13 - 44 %    MONOCYTES 7 4.0 - 12.0 %    EOSINOPHILS 0 (L) 0.5 - 7.8 %    BASOPHILS 0 0.0 - 2.0 %    IMMATURE GRANULOCYTES 1 0.0 - 5.0 %    ABS. NEUTROPHILS 7.3 1.7 - 8.2 K/UL    ABS. LYMPHOCYTES 1.2 0.5 - 4.6 K/UL    ABS. MONOCYTES 0.6 0.1 - 1.3 K/UL    ABS. EOSINOPHILS 0.0 0.0 - 0.8 K/UL    ABS. BASOPHILS 0.0 0.0 - 0.2 K/UL    ABS. IMM. GRANS. 0.1 0.0 - 0.5 K/UL   METABOLIC PANEL, BASIC    Collection Time: 08/30/21  6:20 PM   Result Value Ref Range    Sodium 142 138 - 145 mmol/L    Potassium 4.5 3.5 - 5.1 mmol/L    Chloride 113 (H) 98 - 107 mmol/L    CO2 20 (L) 21 - 32 mmol/L    Anion gap 9 7 - 16 mmol/L    Glucose 171 (H) 65 - 100 mg/dL    BUN 77 (H) 8 - 23 MG/DL    Creatinine 1.40 0.8 - 1.5 MG/DL    GFR est AA >60 >60 ml/min/1.73m2    GFR est non-AA 53 (L) >60 ml/min/1.73m2    Calcium 8.1 (L) 8.3 - 10.4 MG/DL       All Micro Results     None          Other Studies:  No results found.       Medications Administered     0.9% sodium chloride infusion     Admin Date  08/30/2021 Action  New Bag Dose  150 mL/hr Rate  150 mL/hr Route  IntraVENous Administered By  Lakisha Overton, ELINOR          pantoprazole (PROTONIX) 80 mg in 0.9% sodium chloride 100 mL infusion     Admin Date  08/31/2021 Action  New Bag Dose  8 mg/hr Rate  10 mL/hr Route  IntraVENous Administered By  Lakisha Overton RN                  Signed:  Calleen Jeans, MD

## 2021-08-31 NOTE — CONSULTS
[facility] Consult Note       Referring Physician:  Dr. Luis Keyes Date:  8/30/2021    Admit Date:  8/30/2021    Chief Complaint:  UGI bleed, Anemia    Subjective:     History of Present Illness:  Patient is a 76 y.o. with h/o DMII, HTN, MARILIN, anxiety, hypothyroidism, CAD with stent (10/2020) on Brilinta, GERD, and achalasia who is seen in consultation at the request of Dr. Lindsey Fuentes for upper GI bleed. New onset of eusebio hematemesis starting 8/29/21 with associated melena. Hgb has trended from baseline of 13 down to 7.6. INR is 1.3. Last dose of Brilinta was in AM on 8/30/21. Negative for h/o liver disease. Denies NSAID use. He has frequent chest pressure with PO intake and secretions secondary to achalasia. He attempts to regurgitate secretions and food bolus frequently. He often will gag himself to induce vomiting/regurgitation. During one of these episodes yesterday, he started to have eusebio hematemesis that continued overnight. The amount of blood in vomitus has improved slightly this PM.  Last BM was early in AM on 8/30/21. Melena was present at that time. Denies CP/SOB and fevers/chills. Negative for abdominal pain. ROS is otherwise negative. EGD (9/2017): LA Class B esophagitis. Possible Vincent's changes. Esophageal biopsies revealed acid reflux changes but were negative for Vincent's. Colonoscopy (9/2017): Pan-diverticulosis. 4 small TA colon polyps. Repeat exam due 9/2020 with 2 day bowel prep    PMH:  Past Medical History:   Diagnosis Date    Achalasia     Diabetes mellitus [address] [address]     Hypertension     Intractable hiccups    Colon polyps  Hypothyroidism  MARILIN  Anxiety  GERD  CAD (BOB 10/2020)  Chronic kidney disease    PSH:  Heller myotomy  Achalasia balloon dilation    Allergies: Allergies   Allergen Reactions    Baclofen Other (comments)     HALLUCINATIONS       Home Medications:  Prior to Admission medications    Medication Sig Start Date End Date Taking? Authorizing Provider   diazePAM (VALIUM) 5 mg tablet Take 1 hour before MRI. If needed, may repeat dose. 8/9/19   Rayne Bacon MD   co-enzyme Q-10 (CO Q-10) 100 mg capsule Take 100 mg by mouth daily. Provider, Historical   OTHER Tumeric  10 ml once daily    Provider, Historical   testosterone cypionate (DEPOTESTOTERONE CYPIONATE) 200 mg/mL injection by IntraMUSCular route once. .6ml q 2 weeks    Provider, Historical   HYDROcodone-acetaminophen (NORCO) 7.5-325 mg per tablet Take  by mouth. Prn    Provider, Historical   cholecalciferol, vitamin D3, (VITAMIN D3 PO) Take  by mouth. 5,00O international    Provider, Historical   ascorbic acid (VITAMIN C PO) Take  by mouth. Provider, Historical   vitamin E acetate (VITAMIN E PO) Take  by mouth. Provider, Historical   gabapentin (NEURONTIN) 300 mg capsule Take 1 Cap by mouth three (3) times daily as needed for Pain. 8/6/19   Rayne Bacon MD   benazepril-hydroCHLOROthiazide (LOTENSIN HCT) 20-12.5 mg per tablet Take 1 Tab by mouth daily. 2/27/19   Doron Laguerre, NP   metFORMIN (GLUCOPHAGE) 500 mg tablet Take 500 mg by mouth daily (with breakfast). Provider, Historical   carvedilol (COREG) 12.5 mg tablet Take  by mouth two (2) times daily (with meals). Provider, Historical   levothyroxine (SYNTHROID) 25 mcg tablet Take 88 mcg by mouth Daily (before breakfast).     Provider, Historical       Hospital Medications:  Current Facility-Administered Medications   Medication Dose Route Frequency    sodium chloride (NS) flush 5-40 mL  5-40 mL IntraVENous PRN    acetaminophen (TYLENOL) tablet 650 mg  650 mg Oral Q6H PRN    Or    acetaminophen (TYLENOL) suppository 650 mg  650 mg Rectal Q6H PRN    polyethylene glycol (MIRALAX) packet 17 g  17 g Oral DAILY PRN    ondansetron (ZOFRAN ODT) tablet 4 mg  4 mg Oral Q8H PRN    Or    ondansetron (ZOFRAN) injection 4 mg  4 mg IntraVENous Q6H PRN    0.9% sodium chloride infusion  150 mL/hr IntraVENous CONTINUOUS    pantoprazole (PROTONIX) 40 mg in 0.9% sodium chloride 10 mL injection  40 mg IntraVENous Q12H    [START ON 8/31/2021] levothyroxine (SYNTHROID) tablet 88 mcg  88 mcg Oral ACB       Social History:  Remote tobacco use. Negative for EtOH and illicit drug use. Family History:  Negative for family h/o colon cancer. Review of Systems:  A detailed 10 system ROS is obtained, with pertinent positives as listed above. All others are negative. Diet:  NPO    Objective:     Physical Exam:  Vitals:  Visit Vitals  /77   Pulse 98   Temp 99 °F (37.2 °C)   Resp 16   SpO2 98%     Gen:  NAD  HEENT: Sclerae anicteric. MMM. Cardiovascular: Regular rate and rhythm. Respiratory:  CTA bilaterally  GI:  Soft, NT, ND, +BS  Rectal:  Deferred  Neurological:  AAOx3      Laboratory:    Recent Labs     08/30/21  1820 08/30/21  1625   WBC 9.3  --    HGB 7.6*  --    HCT 24.9*  --      --    MCV 97.3  --     142   K 4.5 5.6*   * 113*   CO2 20* 22   BUN 77* 76*   CREA 1.40 1.37   CA 8.1* 8.3   * 175*   AP  --  106   ALB  --  3.1*   TP  --  5.6*   PTP  --  16.6*   INR  --  1.3          Assessment: 1. Upper GI Bleed  2. Anemia    Plan:       1. NPO  2. Protonix drip  3. Avoid heparin/lovenox/NSAIDS  4. Hold Brilinta  5. Monitor Hgb q6H. Transfuse pRBC PRN for goal Hgb of 7-8.   6. INR and Plt are at goal.  7. Will reassess in AM for possible EGD  8. Patient is overdue for colonoscopy for surveillance of polyps. Exam was due 9/2020. Will need to be scheduled as an outpatient after 10/2021 once Brilinta can be held for elective procedure.     Will follow    Hal Toledo MD

## 2021-08-31 NOTE — H&P (VIEW-ONLY)
[facility] Consult Note       Referring Physician:  Dr. Delonte Palma Date:  8/30/2021    Admit Date:  8/30/2021    Chief Complaint:  UGI bleed, Anemia    Subjective:     History of Present Illness:  Patient is a 76 y.o. with h/o DMII, HTN, MARILIN, anxiety, hypothyroidism, CAD with stent (10/2020) on Brilinta, GERD, and achalasia who is seen in consultation at the request of Dr. Donny Baird for upper GI bleed. New onset of eusebio hematemesis starting 8/29/21 with associated melena. Hgb has trended from baseline of 13 down to 7.6. INR is 1.3. Last dose of Brilinta was in AM on 8/30/21. Negative for h/o liver disease. Denies NSAID use. He has frequent chest pressure with PO intake and secretions secondary to achalasia. He attempts to regurgitate secretions and food bolus frequently. He often will gag himself to induce vomiting/regurgitation. During one of these episodes yesterday, he started to have eusebio hematemesis that continued overnight. The amount of blood in vomitus has improved slightly this PM.  Last BM was early in AM on 8/30/21. Melena was present at that time. Denies CP/SOB and fevers/chills. Negative for abdominal pain. ROS is otherwise negative. EGD (9/2017): LA Class B esophagitis. Possible Vincent's changes. Esophageal biopsies revealed acid reflux changes but were negative for Vincent's. Colonoscopy (9/2017): Pan-diverticulosis. 4 small TA colon polyps. Repeat exam due 9/2020 with 2 day bowel prep    PMH:  Past Medical History:   Diagnosis Date    Achalasia     Diabetes mellitus ([facility] [address]     Hypertension     Intractable hiccups    Colon polyps  Hypothyroidism  MARILIN  Anxiety  GERD  CAD (BOB 10/2020)  Chronic kidney disease    PSH:  Heller myotomy  Achalasia balloon dilation    Allergies: Allergies   Allergen Reactions    Baclofen Other (comments)     HALLUCINATIONS       Home Medications:  Prior to Admission medications    Medication Sig Start Date End Date Taking? Authorizing Provider   diazePAM (VALIUM) 5 mg tablet Take 1 hour before MRI. If needed, may repeat dose. 8/9/19   Layne Lezama MD   co-enzyme Q-10 (CO Q-10) 100 mg capsule Take 100 mg by mouth daily. Provider, Historical   OTHER Tumeric  10 ml once daily    Provider, Historical   testosterone cypionate (DEPOTESTOTERONE CYPIONATE) 200 mg/mL injection by IntraMUSCular route once. .6ml q 2 weeks    Provider, Historical   HYDROcodone-acetaminophen (NORCO) 7.5-325 mg per tablet Take  by mouth. Prn    Provider, Historical   cholecalciferol, vitamin D3, (VITAMIN D3 PO) Take  by mouth. 5,00O international    Provider, Historical   ascorbic acid (VITAMIN C PO) Take  by mouth. Provider, Historical   vitamin E acetate (VITAMIN E PO) Take  by mouth. Provider, Historical   gabapentin (NEURONTIN) 300 mg capsule Take 1 Cap by mouth three (3) times daily as needed for Pain. 8/6/19   Layne Lezama MD   benazepril-hydroCHLOROthiazide (LOTENSIN HCT) 20-12.5 mg per tablet Take 1 Tab by mouth daily. 2/27/19   Doron Laguerre, NP   metFORMIN (GLUCOPHAGE) 500 mg tablet Take 500 mg by mouth daily (with breakfast). Provider, Historical   carvedilol (COREG) 12.5 mg tablet Take  by mouth two (2) times daily (with meals). Provider, Historical   levothyroxine (SYNTHROID) 25 mcg tablet Take 88 mcg by mouth Daily (before breakfast).     Provider, Historical       Hospital Medications:  Current Facility-Administered Medications   Medication Dose Route Frequency    sodium chloride (NS) flush 5-40 mL  5-40 mL IntraVENous PRN    acetaminophen (TYLENOL) tablet 650 mg  650 mg Oral Q6H PRN    Or    acetaminophen (TYLENOL) suppository 650 mg  650 mg Rectal Q6H PRN    polyethylene glycol (MIRALAX) packet 17 g  17 g Oral DAILY PRN    ondansetron (ZOFRAN ODT) tablet 4 mg  4 mg Oral Q8H PRN    Or    ondansetron (ZOFRAN) injection 4 mg  4 mg IntraVENous Q6H PRN    0.9% sodium chloride infusion  150 mL/hr IntraVENous CONTINUOUS    pantoprazole (PROTONIX) 40 mg in 0.9% sodium chloride 10 mL injection  40 mg IntraVENous Q12H    [START ON 8/31/2021] levothyroxine (SYNTHROID) tablet 88 mcg  88 mcg Oral ACB       Social History:  Remote tobacco use. Negative for EtOH and illicit drug use. Family History:  Negative for family h/o colon cancer. Review of Systems:  A detailed 10 system ROS is obtained, with pertinent positives as listed above. All others are negative. Diet:  NPO    Objective:     Physical Exam:  Vitals:  Visit Vitals  /77   Pulse 98   Temp 99 °F (37.2 °C)   Resp 16   SpO2 98%     Gen:  NAD  HEENT: Sclerae anicteric. MMM. Cardiovascular: Regular rate and rhythm. Respiratory:  CTA bilaterally  GI:  Soft, NT, ND, +BS  Rectal:  Deferred  Neurological:  AAOx3      Laboratory:    Recent Labs     08/30/21  1820 08/30/21  1625   WBC 9.3  --    HGB 7.6*  --    HCT 24.9*  --      --    MCV 97.3  --     142   K 4.5 5.6*   * 113*   CO2 20* 22   BUN 77* 76*   CREA 1.40 1.37   CA 8.1* 8.3   * 175*   AP  --  106   ALB  --  3.1*   TP  --  5.6*   PTP  --  16.6*   INR  --  1.3          Assessment: 1. Upper GI Bleed  2. Anemia    Plan:       1. NPO  2. Protonix drip  3. Avoid heparin/lovenox/NSAIDS  4. Hold Brilinta  5. Monitor Hgb q6H. Transfuse pRBC PRN for goal Hgb of 7-8.   6. INR and Plt are at goal.  7. Will reassess in AM for possible EGD  8. Patient is overdue for colonoscopy for surveillance of polyps. Exam was due 9/2020. Will need to be scheduled as an outpatient after 10/2021 once Brilinta can be held for elective procedure.     Will follow    Kvng Charles MD

## 2021-08-31 NOTE — INTERVAL H&P NOTE
Update History & Physical    The Patient's History and Physical of  ,    was reviewed with the patient and I examined the patient. There was no change. Endoscopy and risks explained to the patient. Risks including reaction to sedation, cardiopulmonary events, infection, bleeding, perforation, requirement for surgery if complications should occur, death were explained to patient who expressed understanding and agreed to proceed with endoscopy. .  The surgical site was confirmed by the patient and me. Plan:  The risk, benefits, expected outcome, and alternative to the recommended procedure have been discussed with the patient. Patient understands and wants to proceed with the procedure.     Electronically signed by Margie Messina MD on 8/31/2021 at 11:15 AM

## 2021-08-31 NOTE — PROGRESS NOTES
08/30/21 1864   Skin Integumentary   Skin Integumentary (WDL) WDL    Pressure  Injury Documentation No Pressure Injury Noted-Pressure Ulcer Prevention Initiated     No open areas noted, or skin issues visualized

## 2021-08-31 NOTE — ANESTHESIA PREPROCEDURE EVALUATION
Relevant Problems   CARDIOVASCULAR   (+) Hypertension      ENDOCRINE   (+) Controlled type 2 diabetes mellitus without complication, without long-term current use of insulin (HCC)   (+) Type 2 diabetes mellitus with diabetic neuropathy (HCC)       Anesthetic History   No history of anesthetic complications            Review of Systems / Medical History  Patient summary reviewed and pertinent labs reviewed    Pulmonary        Sleep apnea: CPAP           Neuro/Psych   Within defined limits           Cardiovascular    Hypertension        Dysrhythmias (RBBB)   Past MI, cardiac stents (on brillinta ) and hyperlipidemia    Exercise tolerance: >4 METS     GI/Hepatic/Renal         Renal disease: CRI      Comments: Achalasia, inability to handle secretions    Recent hematemesis and melena     Chronic intractable hiccups Endo/Other    Diabetes    Arthritis and anemia     Other Findings            Physical Exam    Airway  Mallampati: I  TM Distance: 4 - 6 cm  Neck ROM: decreased range of motion   Mouth opening: Normal     Cardiovascular    Rhythm: regular  Rate: normal         Dental    Dentition: Upper partial plate     Pulmonary  Breath sounds clear to auscultation               Abdominal  GI exam deferred       Other Findings            Anesthetic Plan    ASA: 3, emergent  Anesthesia type: general  ETT, RSI  Glidescope         Induction: Intravenous and RSI  Anesthetic plan and risks discussed with: Patient      Patient with significant achalasia, inability to handle secretions, and recent hematemesis - will plan on RSI and ETT for airway protection

## 2021-08-31 NOTE — PROCEDURES
Esophagogastroduodenoscopy    DATE of PROCEDURE: 8/31/2021    INDICATIONS:  1. Acute blood loss anemia  2. Hematemesis     MEDICATIONS ADMINISTERED: MAC    INSTRUMENT: GIF    PROCEDURE:  After obtaining informed consent, the patient was placed in the left lateral position and sedated. The endoscope was advanced under direct vision without difficulty. The esophagus, stomach (including retroflexed views) and duodenum were evaluated. The patient was taken to the recovery area in stable condition. FINDINGS:  ESOPHAGUS: Class D esophagitis in the distal esophagus. No active bleed. Biopsies taken. STOMACH: status post Nissen fundoplication. Otherwise normal.  DUODENUM: normal    Estimated blood loss: 0-minimal     PLAN:  1. BID PPI  2.  Clear liquids    Estela Cantor MD  [facility], [address]

## 2021-08-31 NOTE — PROGRESS NOTES
TRANSFER - IN REPORT:    Verbal report received from Jarrod Tavera RN on Sumaya Lara  being received from [facility] for routine progression of care      Report consisted of patients Situation, Background, Assessment and   Recommendations(SBAR). Information from the following report(s) Kardex, ED Summary, MAR, Recent Results and Dual Neuro Assessment was reviewed with the receiving nurse. Opportunity for questions and clarification was provided. Assessment completed upon patients arrival to unit and care assumed.

## 2021-08-31 NOTE — PROGRESS NOTES
Care Management Interventions  PCP Verified by CM: Yes  Last Visit to PCP: 06/23/21  Current Support Network:  (Lives w/ Girlfriend)  Confirm Follow Up Transport: Family  The Plan for Transition of Care is Related to the Following Treatment Goals : Return Home  Discharge Location  Discharge Placement: Home    SW spoke w/ pt,pt is a 77 y/o male that is admitted for GI Bleed. Pt lives w/ his girlfriend and she is out of town, so his son will be his caregiver daily until his girlfriend comes back. Pt does not need help w/ bathing and clothing, but has been sick for a while and has felt weak. Pt does use a CPAP at home. Pt's PCP is Dr. Bud Cross and his last visit was in June. Pt's pharmacy of choice is [facility]. Pt is concerned as to why he continues to bleed.  ANDREW will follow pt until d/c    ARIAN Gutierrez

## 2021-08-31 NOTE — PROGRESS NOTES
Into room to hang IV continuous protonix and noted patient had accidentally pulled out IV site to RAC from previous insertion. Cath tip intact. Restarted with a #20 IV jelco and ordered timed HgB drawn without difficulty. IVF restarted. Tolerated well.

## 2021-09-01 LAB
ABO + RH BLD: NORMAL
ANION GAP SERPL CALC-SCNC: 3 MMOL/L (ref 7–16)
BLD PROD TYP BPU: NORMAL
BLD PROD TYP BPU: NORMAL
BLOOD GROUP ANTIBODIES SERPL: NORMAL
BPU ID: NORMAL
BPU ID: NORMAL
BUN SERPL-MCNC: 42 MG/DL (ref 8–23)
CALCIUM SERPL-MCNC: 7.6 MG/DL (ref 8.3–10.4)
CHLORIDE SERPL-SCNC: 119 MMOL/L (ref 98–107)
CO2 SERPL-SCNC: 24 MMOL/L (ref 21–32)
CREAT SERPL-MCNC: 1.18 MG/DL (ref 0.8–1.5)
CROSSMATCH RESULT,%XM: NORMAL
CROSSMATCH RESULT,%XM: NORMAL
ERYTHROCYTE [DISTWIDTH] IN BLOOD BY AUTOMATED COUNT: 15.5 % (ref 11.9–14.6)
EST. AVERAGE GLUCOSE BLD GHB EST-MCNC: 123 MG/DL
GLUCOSE BLD STRIP.AUTO-MCNC: 103 MG/DL (ref 65–100)
GLUCOSE BLD STRIP.AUTO-MCNC: 124 MG/DL (ref 65–100)
GLUCOSE SERPL-MCNC: 96 MG/DL (ref 65–100)
HBA1C MFR BLD: 5.9 % (ref 4.2–6.3)
HCT VFR BLD AUTO: 21.5 % (ref 41.1–50.3)
HCT VFR BLD AUTO: 23.3 % (ref 41.1–50.3)
HCT VFR BLD AUTO: 24.1 % (ref 41.1–50.3)
HCT VFR BLD AUTO: 24.5 % (ref 41.1–50.3)
HGB BLD-MCNC: 7.1 G/DL (ref 13.6–17.2)
HGB BLD-MCNC: 7.6 G/DL (ref 13.6–17.2)
HGB BLD-MCNC: 7.7 G/DL (ref 13.6–17.2)
HGB BLD-MCNC: 7.8 G/DL (ref 13.6–17.2)
MCH RBC QN AUTO: 31.1 PG (ref 26.1–32.9)
MCHC RBC AUTO-ENTMCNC: 33 G/DL (ref 31.4–35)
MCV RBC AUTO: 94.3 FL (ref 79.6–97.8)
NRBC # BLD: 0 K/UL (ref 0–0.2)
PLATELET # BLD AUTO: 84 K/UL (ref 150–450)
PMV BLD AUTO: 11.7 FL (ref 9.4–12.3)
POTASSIUM SERPL-SCNC: 3.8 MMOL/L (ref 3.5–5.1)
RBC # BLD AUTO: 2.28 M/UL (ref 4.23–5.6)
SERVICE CMNT-IMP: ABNORMAL
SERVICE CMNT-IMP: ABNORMAL
SODIUM SERPL-SCNC: 146 MMOL/L (ref 136–145)
SPECIMEN EXP DATE BLD: NORMAL
STATUS OF UNIT,%ST: NORMAL
STATUS OF UNIT,%ST: NORMAL
UNIT DIVISION, %UDIV: 0
UNIT DIVISION, %UDIV: 0
WBC # BLD AUTO: 6 K/UL (ref 4.3–11.1)

## 2021-09-01 PROCEDURE — 74011250636 HC RX REV CODE- 250/636: Performed by: FAMILY MEDICINE

## 2021-09-01 PROCEDURE — C9113 INJ PANTOPRAZOLE SODIUM, VIA: HCPCS | Performed by: FAMILY MEDICINE

## 2021-09-01 PROCEDURE — 82962 GLUCOSE BLOOD TEST: CPT

## 2021-09-01 PROCEDURE — 36415 COLL VENOUS BLD VENIPUNCTURE: CPT

## 2021-09-01 PROCEDURE — 97530 THERAPEUTIC ACTIVITIES: CPT

## 2021-09-01 PROCEDURE — 74011250637 HC RX REV CODE- 250/637: Performed by: FAMILY MEDICINE

## 2021-09-01 PROCEDURE — 85027 COMPLETE CBC AUTOMATED: CPT

## 2021-09-01 PROCEDURE — 97165 OT EVAL LOW COMPLEX 30 MIN: CPT

## 2021-09-01 PROCEDURE — 80048 BASIC METABOLIC PNL TOTAL CA: CPT

## 2021-09-01 PROCEDURE — 85014 HEMATOCRIT: CPT

## 2021-09-01 PROCEDURE — 74011636637 HC RX REV CODE- 636/637: Performed by: FAMILY MEDICINE

## 2021-09-01 PROCEDURE — 77030040361 HC SLV COMPR DVT MDII -B

## 2021-09-01 PROCEDURE — 97535 SELF CARE MNGMENT TRAINING: CPT

## 2021-09-01 PROCEDURE — 2709999900 HC NON-CHARGEABLE SUPPLY

## 2021-09-01 PROCEDURE — 83036 HEMOGLOBIN GLYCOSYLATED A1C: CPT

## 2021-09-01 PROCEDURE — 65270000029 HC RM PRIVATE

## 2021-09-01 PROCEDURE — 74011000250 HC RX REV CODE- 250: Performed by: FAMILY MEDICINE

## 2021-09-01 PROCEDURE — 97161 PT EVAL LOW COMPLEX 20 MIN: CPT

## 2021-09-01 RX ORDER — DEXTROSE 50 % IN WATER (D50W) INTRAVENOUS SYRINGE
25-50 AS NEEDED
Status: DISCONTINUED | OUTPATIENT
Start: 2021-09-01 | End: 2021-09-02 | Stop reason: HOSPADM

## 2021-09-01 RX ORDER — DEXTROSE 40 %
15 GEL (GRAM) ORAL AS NEEDED
Status: DISCONTINUED | OUTPATIENT
Start: 2021-09-01 | End: 2021-09-02 | Stop reason: HOSPADM

## 2021-09-01 RX ORDER — PANTOPRAZOLE SODIUM 40 MG/1
40 TABLET, DELAYED RELEASE ORAL
Status: DISCONTINUED | OUTPATIENT
Start: 2021-09-02 | End: 2021-09-02

## 2021-09-01 RX ORDER — INSULIN LISPRO 100 [IU]/ML
INJECTION, SOLUTION INTRAVENOUS; SUBCUTANEOUS
Status: DISCONTINUED | OUTPATIENT
Start: 2021-09-01 | End: 2021-09-02 | Stop reason: HOSPADM

## 2021-09-01 RX ADMIN — SODIUM CHLORIDE 40 MG: 9 INJECTION, SOLUTION INTRAMUSCULAR; INTRAVENOUS; SUBCUTANEOUS at 09:22

## 2021-09-01 RX ADMIN — SODIUM CHLORIDE 150 ML/HR: 900 INJECTION, SOLUTION INTRAVENOUS at 02:51

## 2021-09-01 RX ADMIN — INSULIN LISPRO 2 UNITS: 100 INJECTION, SOLUTION INTRAVENOUS; SUBCUTANEOUS at 12:38

## 2021-09-01 RX ADMIN — LEVOTHYROXINE SODIUM 88 MCG: 0.09 TABLET ORAL at 06:43

## 2021-09-01 RX ADMIN — Medication 10 ML: at 20:39

## 2021-09-01 RX ADMIN — SODIUM CHLORIDE 150 ML/HR: 900 INJECTION, SOLUTION INTRAVENOUS at 09:22

## 2021-09-01 NOTE — PROGRESS NOTES
[facility] Progress Note         Admit Date:  8/30/2021    Today's Date:  9/1/2021    CC:  GI Bleed    Subjective:     Patient tolerating clear diet, no bleeding, no BM since yesterday. EGD with circumferential severe esophagitis as etiology of GI bleed. Medications:   Current Facility-Administered Medications   Medication Dose Route Frequency    dextrose 40% (GLUTOSE) oral gel 1 Tube  15 g Oral PRN    glucagon (GLUCAGEN) injection 1 mg  1 mg IntraMUSCular PRN    dextrose (D50W) injection syrg 12.5-25 g  25-50 mL IntraVENous PRN    insulin lispro (HUMALOG) injection   SubCUTAneous AC&HS    0.9% sodium chloride infusion 250 mL  250 mL IntraVENous PRN    pantoprazole (PROTONIX) 40 mg in 0.9% sodium chloride 10 mL injection  40 mg IntraVENous Q12H    sodium chloride (NS) flush 5-40 mL  5-40 mL IntraVENous PRN    acetaminophen (TYLENOL) tablet 650 mg  650 mg Oral Q6H PRN    Or    acetaminophen (TYLENOL) suppository 650 mg  650 mg Rectal Q6H PRN    polyethylene glycol (MIRALAX) packet 17 g  17 g Oral DAILY PRN    ondansetron (ZOFRAN ODT) tablet 4 mg  4 mg Oral Q8H PRN    Or    ondansetron (ZOFRAN) injection 4 mg  4 mg IntraVENous Q6H PRN    0.9% sodium chloride infusion  150 mL/hr IntraVENous CONTINUOUS    levothyroxine (SYNTHROID) tablet 88 mcg  88 mcg Oral ACB       Review of Systems:  ROS was obtained, with pertinent positives as listed above. No chest pain or SOB. Diet:      Objective:   Vitals:  Visit Vitals  BP (!) 158/77 (BP 1 Location: Right upper arm, BP Patient Position: At rest)   Pulse 64   Temp 97.9 °F (36.6 °C)   Resp 17   SpO2 98%     Intake/Output:  09/01 0701 - 09/01 1900  In: -   Out: 975 [Urine:975]  08/30 1901 - 09/01 0700  In: 1861.1 [P.O.:600; I.V.:400]  Out: 400 [Urine:400]  Exam:  General appearance: alert, cooperative, no distress  Lungs: clear to auscultation bilaterally anteriorly  Heart: regular rate and rhythm  Abdomen: soft, non-tender.  Bowel sounds normal. No masses, no organomegaly  Extremities: extremities normal, atraumatic, no cyanosis or edema  Neuro:  alert and oriented    Data Review (Labs):    Recent Labs     09/01/21  0813 09/01/21  0415 08/31/21  1737 08/31/21  1107 08/31/21  0403 08/31/21  0048 08/30/21  1820 08/30/21  1625   WBC  --  6.0  --   --   --   --  9.3  --    HGB 7.8* 7.1* 7.9* 7.8* 6.9* 7.3* 7.6*  --    HCT 24.1* 21.5* 24.0* 25.2* 21.2* 23.0* 24.9*  --    PLT  --  84*  --   --   --   --  168  --    MCV  --  94.3  --   --   --   --  97.3  --    NA  --  146*  --   --  144  --  142 142   K  --  3.8  --   --  4.0  --  4.5 5.6*   CL  --  119*  --   --  116*  --  113* 113*   CO2  --  24  --   --  23  --  20* 22   BUN  --  42*  --   --  80*  --  77* 76*   CREA  --  1.18  --   --  1.48  --  1.40 1.37   CA  --  7.6*  --   --  8.0*  --  8.1* 8.3   GLU  --  96  --   --  145*  --  171* 175*   AP  --   --   --   --   --   --   --  106   AST  --   --   --   --   --   --   --  23   ALT  --   --   --   --   --   --   --  45   TBILI  --   --   --   --   --   --   --  0.6   ALB  --   --   --   --   --   --   --  3.1*   TP  --   --   --   --   --   --   --  5.6*   PTP  --   --   --   --   --   --   --  16.6*   INR  --   --   --   --   --   --   --  1.3       Assessment:     Principal Problem:    GI bleed (8/31/2021)    Active Problems:    Controlled type 2 diabetes mellitus without complication, without long-term current use of insulin ([facility] [address] (2/26/2019)      Esophageal dysmotility (2/26/2019)      Hypertension (2/27/2019)        Plan:     Upper GI bleed-  No bleeding or BM since yesterday. Treat esophagitis with bid ppi. Office follow up 3-4 weeks. We will sign off.

## 2021-09-01 NOTE — PROGRESS NOTES
Shift assessment complete. Patient is alert and oriented, in bed. Respirations are even and unlabored. Patient is on room air. Bowel sounds are present. Normal saline infusing at 150 ml/hr. Patient has had no bowel movement today. No complaints of pain of discomfort at this time.    Bed low and locked, call light within reach.   _________________________________________________    Problem: Upper and Lower GI Bleed: Day 1  Goal: Respiratory  Outcome: Progressing Towards Goal  Goal: Psychosocial  Outcome: Progressing Towards Goal  Goal: *Optimal pain control at patient's stated goal  Outcome: Progressing Towards Goal  Goal: *Hemodynamically stable  Outcome: Progressing Towards Goal  Goal: *Demonstrates progressive activity  Outcome: Progressing Towards Goal

## 2021-09-01 NOTE — PROGRESS NOTES
Problem: Mobility Impaired (Adult and Pediatric)  Goal: *Acute Goals and Plan of Care (Insert Text)  Outcome: Progressing Towards Goal  Note:   LTG:  (1.)Mr. Ian Ball will move from supine to sit and sit to supine  in bed with SUPERVISION within 7 treatment day(s). (2.)Mr. Ian Ball will transfer from bed to chair and chair to bed with SUPERVISION using the least restrictive device within 7 treatment day(s). (3.)Mr. Ian Ball will ambulate with SUPERVISION for 500 feet with the least restrictive device within 7 treatment day(s). (4)Mr. Ian Ball will go up and down 5 steps with rail CGa in 7 days. ________________________________________________________________________________________________       PHYSICAL THERAPY: Initial Assessment and PM 9/1/2021  INPATIENT: PT Visit Days : 1  [place] / [place] / Product Type: Medicare /       NAME/AGE/GENDER: Reshma Florian is a 76 y.o. male   PRIMARY DIAGNOSIS: GI bleed [K92.2] GI bleed GI bleed  Procedure(s) (LRB):  ESOPHAGOGASTRODUODENOSCOPY (EGD) (N/A)  ESOPHAGOGASTRODUODENAL (EGD) BIOPSY (N/A)  1 Day Post-Op  ICD-10: Treatment Diagnosis:    Generalized Muscle Weakness (M62.81)  Other abnormalities of gait and mobility (R26.89)   Precaution/Allergies:  Baclofen      ASSESSMENT:     Mr. Ian Ball presents with general weakness with above diagnosis. He is normally independent and lives with girlfriend. This am, he reports feeling better. He was up on contact this am and ambulated in the tam without assistive device SBA. Mild fatigue after gait. He plans to go home with girlfriend. Will follow to increase gait and stair and mobility.     This section established at most recent assessment   PROBLEM LIST (Impairments causing functional limitations):  Decreased Strength  Decreased ADL/Functional Activities  Decreased Transfer Abilities  Decreased Ambulation Ability/Technique  Decreased Balance  Increased Pain  Decreased Activity Tolerance  Increased Fatigue  Decreased Flexibility/Joint Mobility  Decreased [place] with Home Exercise Program   INTERVENTIONS PLANNED: (Benefits and precautions of physical therapy have been discussed with the patient.)  Balance Exercise  Bed Mobility  Family Education  Gait Training  Home Exercise Program (HEP)  Range of Motion (ROM)  Therapeutic Activites  Therapeutic Exercise/Strengthening  Transfer Training     TREATMENT PLAN: Frequency/Duration: daily for duration of hospital stay  Rehabilitation Potential For Stated Goals: Good     REHAB RECOMMENDATIONS (at time of discharge pending progress):    Placement:  Home, HHPT? Equipment:   None at this time              HISTORY:   History of Present Injury/Illness (Reason for Referral):  76 y.o. male with medical history of GERD, DM2, HTN, MARILIN, CAD on Brilinta who presented to ED with report of vomiting blood. Patient reports that this started yesterday. He has experienced several episodes, and reports that earlier today, he noticed his stool was dark. On initial ER presentation, patient was borderline hypotensive with BP of 94/68 which improved after IVFs. Hgb 7.6, with baseline ~13. Hemoccult positive. Dr. Mik Mari of GI was consulted. Hospitalist asked to admit. Due to lack of beds at [facility] and stability of patient, case with discussed further between Dr. Mik Mari and  hospitalist, and decision was made to directly admit patient to [facility] for continued care. Pt admitted for Acute GI bleed and GI was consulted. He underwent EGD on 8/31 that shows class D esophagitis, no active bleed. He is on PPI IV BID with Hgb check q6h. Subjective (09/01/21): Still nauseous some with \"specks of blood\" but not like what it was when he first came in. Has not had a BM since EGD. Tolerating clear liquid diet. Denies fever, SOB, chest pain, abdominal pain, vomiting.   Past Medical History/Comorbidities:   Mr. Ian Ball  has a past medical history of Achalasia, Diabetes mellitus ([facility] [address] Hypertension, and Intractable hiccups. Mr. Markus Lynn  has no past surgical history on file. Social History/Living Environment:   Home Environment: Private residence  # Steps to Enter: 2  Rails to Enter: Yes  One/Two Story Residence: Two story  # of Interior Steps: 10  Living Alone: No  Support Systems: Spouse/Significant Other/Partner  Patient Expects to be Discharged to[de-identified]  (sons apartment 3 rd floor with elevator)  Current DME Used/Available at Home: None  Tub or Shower Type: Shower  Prior Level of Function/Work/Activity:  independent     Number of Personal Factors/Comorbidities that affect the Plan of Care: 1-2: MODERATE COMPLEXITY   EXAMINATION:   Most Recent Physical Functioning:   Gross Assessment:  AROM: Within functional limits (LE's)  Strength: Generally decreased, functional (LE's)               Posture:     Balance:  Sitting: Intact  Standing: Intact  Difficulty with higher level balance like single leg stance Bed Mobility:     Wheelchair Mobility:     Transfers:  Sit to Stand: Stand-by assistance  Stand to Sit: Stand-by assistance  Duration: 10 Minutes  Gait:     Speed/Mariajose: Delayed  Distance (ft): 150 Feet (ft)  Ambulation - Level of Assistance: Stand-by assistance  Interventions: Safety awareness training;Verbal cues      Body Structures Involved:  Bones  Joints  Muscles  Ligaments Body Functions Affected: Movement Related Activities and Participation Affected: Mobility   Number of elements that affect the Plan of Care: 3: MODERATE COMPLEXITY   CLINICAL PRESENTATION:   Presentation: Stable and uncomplicated: LOW COMPLEXITY   CLINICAL DECISION MAKING:   [facility] AM-PAC 6 Clicks   Basic Mobility Inpatient Short Form  How much difficulty does the patient currently have. .. Unable A Lot A Little None   1. Turning over in bed (including adjusting bedclothes, sheets and blankets)? [] 1   [] 2   [x] 3   [] 4   2.   Sitting down on and standing up from a chair with arms ( e.g., wheelchair, bedside commode, etc.)   [] 1   [] 2   [x] 3   [] 4   3. Moving from lying on back to sitting on the side of the bed? [] 1   [] 2   [x] 3   [] 4   How much help from another person does the patient currently need. .. Total A Lot A Little None   4. Moving to and from a bed to a chair (including a wheelchair)? [] 1   [] 2   [] 3   [x] 4   5. Need to walk in hospital room? [] 1   [] 2   [] 3   [x] 4   6. Climbing 3-5 steps with a railing? [] 1   [] 2   [x] 3   [] 4   © 2007, Trustees of [address] [place] [address] [address] [id number], under license to [employer]. All rights reserved      Score:  Initial: 20 Most Recent: X (Date: -- )    Interpretation of Tool:  Represents activities that are increasingly more difficult (i.e. Bed mobility, Transfers, Gait). Medical Necessity:     Patient is expected to demonstrate progress in   strength, range of motion, and balance   to   decrease assistance required with exercises and functional mobility  . Reason for Services/Other Comments:  Patient   continues to require present interventions due to patient's inability to perform exercises and functional mobility independently  . Use of outcome tool(s) and clinical judgement create a POC that gives a: Clear prediction of patient's progress: LOW COMPLEXITY            TREATMENT:   (In addition to Assessment/Re-Assessment sessions the following treatments were rendered)   Pre-treatment Symptoms/Complaints:  fatigue  Pain: Initial:      Post Session:  0   assessment  Therapeutic Activity: (  10 Minutes ):  Therapeutic activities including Bed transfers, Chair transfers, Ambulation on level ground, and standing to improve mobility, strength, and balance. Required minimal Safety awareness training;Verbal cues to promote static and dynamic balance in standing.        Braces/Orthotics/Lines/Etc:   IV  O2 Device: None (Room air)  Treatment/Session Assessment:    Response to Treatment:  did fine, some fatigue  Interdisciplinary Collaboration:   Occupational Therapist  Registered Nurse  After treatment position/precautions:   Supine in bed  Bed/Chair-wheels locked  Bed in low position  Call light within reach   Compliance with Program/Exercises: Will assess as treatment progresses  Recommendations/Intent for next treatment session: \"Next visit will focus on advancements to more challenging activities and reduction in assistance provided\".   Total Treatment Duration:  PT Patient Time In/Time Out  Time In: 1150  Time Out: [address]

## 2021-09-01 NOTE — PROGRESS NOTES
Hospitalist Progress Note   Admit Date:  2021 10:46 PM   Name:  Gonzales Garcia   Age:  76 y.o. Sex:  male  :  1945   MRN:  [id number]     Presenting Complaint: No chief complaint on file. Reason(s) for Admission: GI bleed [facility] Course & Interval History:    76 y.o. male with medical history of GERD, DM2, HTN, MARILIN, CAD on Brilinta who presented to ED with report of vomiting blood. Patient reports that this started yesterday. He has experienced several episodes, and reports that earlier today, he noticed his stool was dark. On initial ER presentation, patient was borderline hypotensive with BP of 94/68 which improved after IVFs. Hgb 7.6, with baseline ~13. Hemoccult positive. Dr. Ct Dumont of GI was consulted. Hospitalist asked to admit.     Due to lack of beds at [facility] and stability of patient, case with discussed further between Dr. Ct Dumont and  hospitalist, and decision was made to directly admit patient to [place] [id number] for continued care. Pt admitted for Acute GI bleed and GI was consulted. He underwent EGD on  that shows class D esophagitis, no active bleed. He is on PPI IV BID with Hgb check q6h. Subjective (21): Still nauseous some with \"specks of blood\" but not like what it was when he first came in. Has not had a BM since EGD. Tolerating clear liquid diet. Denies fever, SOB, chest pain, abdominal pain, vomiting. Assessment & Plan:     Acute upper GI bleed   Positive Hemoccult at . Hgb 6.9 on  (bl 13) transfused 2Units PRBC. S/p EGD on --class D esophagitis, no active bleed  Cont to hold ASA and Brilinta  Clear liquid diet  PPI IV BID  Hgb q6h  Type & Screen.  Transfuse if Hgb<7 or if symptomatic  Appreciate GI recs    CAD  S/p LHC w/ PCI/stents in 10/2020  Holding home BB, Ace-I/Hctz to minimize hypotension in setting of GIB  Holding home DAPT    DMII  Holding home metformin  SSI    Hypertension  Holding home Norvasc, BB, ACE-I/Hctz in setting of GIB to minimize hypotension    Hypothyroidism  Cont home Synthroid      Dispo/Discharge Plannin-2 days pending stable hgb and GI recs    Diet:  ADULT DIET Clear Liquid  DVT PPx: SCD's  Code status: Full Code    Active Hospital Problems    Diagnosis Date Noted    GI bleed 2021    Hypertension 2019    Controlled type 2 diabetes mellitus without complication, without long-term current use of insulin ([facility] [address] 2019    Esophageal dysmotility 2019       Objective:     Patient Vitals for the past 24 hrs:   Temp Pulse Resp BP SpO2   21 0340 97.8 °F (36.6 °C) 64 17 125/71 96 %   21 0005 98.2 °F (36.8 °C) 70 18 125/71 97 %   21 1938 98.2 °F (36.8 °C) 71 20 (!) 153/85 98 %   21 1556 98.1 °F (36.7 °C) 70 20 128/71 100 %   21 1510 98.5 °F (36.9 °C) 73 16 (!) 147/70 100 %   21 1430 98.7 °F (37.1 °C) 74 16 135/74 99 %   21 1332 98.4 °F (36.9 °C) 68 16 (!) 125/58 99 %   21 1315 98.1 °F (36.7 °C) 65 16 131/62 100 %   21 1247 97.9 °F (36.6 °C) 76 18 132/74 99 %   21 1230  67 18 135/66 99 %   21 1225  68 18 134/66 98 %   21 1220  68 18 (!) 147/66 99 %   21 1215  75 18 (!) 122/58 98 %   21 1212 97.8 °F (36.6 °C) 77 16 (!) 147/62 98 %   21 1051 97.7 °F (36.5 °C) 64 16 (!) 144/72 99 %   21 1015 98.3 °F (36.8 °C) 66 16 (!) 113/55 99 %   21 0915 98 °F (36.7 °C) 65 16 (!) 114/57 99 %   21 0901 97.3 °F (36.3 °C) 64 20 (!) 126/59 99 %     Oxygen Therapy  O2 Sat (%): 96 % (21 0340)  O2 Device: None (Room air) (21 1230)  O2 Flow Rate (L/min): 3 l/min (21 1215)    Estimated body mass index is 25.94 kg/m² as calculated from the following:    Height as of an earlier encounter on 21: 5' 11\" (1.803 m). Weight as of an earlier encounter on 21: 84.4 kg (186 lb).     Intake/Output Summary (Last 24 hours) at 2021 0787  Last data filed at 2021 0710  Gross per 24 hour   Intake 1861.1 ml   Output 1200 ml   Net 661.1 ml         Physical Exam:   General:    Well nourished. No overt distress  Head:  Normocephalic, atraumatic  Eyes:  Sclerae appear normal.  Pupils equally round. ENT:  Nares appear normal, no drainage. Moist oral mucosa  Neck:  No restricted ROM. Trachea midline   CV:   RRR. No m/r/g. No jugular venous distension. Lungs:   CTAB. No wheezing, rhonchi, or rales. Respirations even, unlabored  Abdomen: Bowel sounds present. Soft, nontender, nondistended. Extremities: No cyanosis or clubbing. No edema  Skin:     No rashes and normal coloration. Warm and dry. Neuro:  Cranial nerves II-XII grossly intact. Sensation intact  Psych:  Normal mood and affect.   Alert and oriented x3    I have reviewed ordered lab tests and independently visualized imaging below:    Last 24hr Labs:  Recent Results (from the past 24 hour(s))   COVID-19 RAPID TEST    Collection Time: 08/31/21 11:02 AM   Result Value Ref Range    Specimen source Nasopharyngeal      COVID-19 rapid test Not detected NOTD     HGB & HCT    Collection Time: 08/31/21 11:07 AM   Result Value Ref Range    HGB 7.8 (L) 13.6 - 17.2 g/dL    HCT 25.2 (L) 41.1 - 50.3 %   GLUCOSE, POC    Collection Time: 08/31/21 11:19 AM   Result Value Ref Range    Glucose (POC) 121 (H) 65 - 100 mg/dL    Performed by [facility]    HGB & HCT    Collection Time: 08/31/21  5:37 PM   Result Value Ref Range    HGB 7.9 (L) 13.6 - 17.2 g/dL    HCT 24.0 (L) 41.1 - 03.4 %   METABOLIC PANEL, BASIC    Collection Time: 09/01/21  4:15 AM   Result Value Ref Range    Sodium 146 (H) 136 - 145 mmol/L    Potassium 3.8 3.5 - 5.1 mmol/L    Chloride 119 (H) 98 - 107 mmol/L    CO2 24 21 - 32 mmol/L    Anion gap 3 (L) 7 - 16 mmol/L    Glucose 96 65 - 100 mg/dL    BUN 42 (H) 8 - 23 MG/DL    Creatinine 1.18 0.8 - 1.5 MG/DL    GFR est AA >60 >60 ml/min/1.73m2    GFR est non-AA >60 >60 ml/min/1.73m2    Calcium 7.6 (L) 8.3 - 10.4 MG/DL   CBC W/O DIFF    Collection Time: 09/01/21  4:15 AM   Result Value Ref Range    WBC 6.0 4.3 - 11.1 K/uL    RBC 2.28 (L) 4.23 - 5.6 M/uL    HGB 7.1 (L) 13.6 - 17.2 g/dL    HCT 21.5 (L) 41.1 - 50.3 %    MCV 94.3 79.6 - 97.8 FL    MCH 31.1 26.1 - 32.9 PG    MCHC 33.0 31.4 - 35.0 g/dL    RDW 15.5 (H) 11.9 - 14.6 %    PLATELET 84 (L) 861 - 450 K/uL    MPV 11.7 9.4 - 12.3 FL    ABSOLUTE NRBC 0.00 0.0 - 0.2 K/uL       All Micro Results     Procedure Component Value Units Date/Time    COVID-19 RAPID TEST [[id number]] Collected: 08/31/21 1102    Order Status: Completed Specimen: Nasopharyngeal Updated: 08/31/21 1122     Specimen source Nasopharyngeal        COVID-19 rapid test Not detected        Comment:      The specimen is NEGATIVE for SARS-CoV-2, the novel coronavirus associated with COVID-19. A negative result does not rule out COVID-19. This test has been authorized by the FDA under an Emergency Use Authorization (EUA) for use by authorized laboratories. Fact sheet for Healthcare Providers: ConventionUpdate.co.nz  Fact sheet for Patients: ConventionUpdate.co.nz       Methodology: Isothermal Nucleic Acid Amplification               Other Studies:  No results found.     Current Meds:  Current Facility-Administered Medications   Medication Dose Route Frequency    0.9% sodium chloride infusion 250 mL  250 mL IntraVENous PRN    pantoprazole (PROTONIX) 40 mg in 0.9% sodium chloride 10 mL injection  40 mg IntraVENous Q12H    sodium chloride (NS) flush 5-40 mL  5-40 mL IntraVENous PRN    acetaminophen (TYLENOL) tablet 650 mg  650 mg Oral Q6H PRN    Or    acetaminophen (TYLENOL) suppository 650 mg  650 mg Rectal Q6H PRN    polyethylene glycol (MIRALAX) packet 17 g  17 g Oral DAILY PRN    ondansetron (ZOFRAN ODT) tablet 4 mg  4 mg Oral Q8H PRN    Or    ondansetron (ZOFRAN) injection 4 mg  4 mg IntraVENous Q6H PRN    0.9% sodium chloride infusion  150 mL/hr IntraVENous CONTINUOUS  levothyroxine (SYNTHROID) tablet 88 mcg  88 mcg Oral ACB       Signed: Joel Rivas DO    Part of this note may have been written by using a voice dictation software. The note has been proof read but may still contain some grammatical/other typographical errors.

## 2021-09-01 NOTE — PROGRESS NOTES
OCCUPATIONAL THERAPY: Initial Assessment, Discharge, and AM 9/1/2021  INPATIENT: OT Visit Days: 1  [place] / [place] / Product Type: Medicare /      NAME/AGE/GENDER: Gonzales Garcia is a 76 y.o. male   PRIMARY DIAGNOSIS:  GI bleed [K92.2] GI bleed GI bleed  Procedure(s) (LRB):  ESOPHAGOGASTRODUODENOSCOPY (EGD) (N/A)  ESOPHAGOGASTRODUODENAL (EGD) BIOPSY (N/A)  1 Day Post-Op  ICD-10: Treatment Diagnosis:    Other lack of cordination (R27.8)  Difficulty in walking, Not elsewhere classified (R26.2)  Other abnormalities of gait and mobility (R26.89)   Precautions/Allergies:     Baclofen      ASSESSMENT:     Mr. Erika Davis presents supine in bed with above diagnosis. He has an IV he is supervision supine to sit. He ambulated in the room SBA and was incontinent of BM in the bed sheets. He cleaned himself up with supervision. His linens were changed. He is supervision with ADLS. He plans to discharge to his sons apartment on 3 rd floor with elevator. His girlfriend is having the whipple procedure at [facility] next week. Prior to admit he was independent. No skilled OT indicated at this time. Patient in agreement with POC. Will discharge OT services. This section established at most recent assessment             REHAB RECOMMENDATIONS (at time of discharge pending progress):    Placement: It is my opinion, based on this patient's performance to date, that Mr. Erika Davis may benefit from being discharged with NO further skilled therapy due to a proven ability to function at baseline. Equipment:   None at this time              OCCUPATIONAL PROFILE AND HISTORY:   History of Present Injury/Illness (Reason for Referral):  See H and P  Past Medical History/Comorbidities:   Mr. Erika Davis  has a past medical history of Achalasia, Diabetes mellitus ([address] Hypertension, and Intractable hiccups. Mr. Erika Davis  has no past surgical history on file.   Social History/Living Environment:   Home Environment: Private residence  # Steps to Enter: 2  Rails to Enter: Yes  One/Two Story Residence: Two story  # of Interior Steps: 10  Living Alone: No  Support Systems: Spouse/Significant Other/Partner  Patient Expects to be Discharged to[de-identified]  (sons apartment 3 rd floor with elevator)  Current DME Used/Available at Home: None  Tub or Shower Type: Shower  Prior Level of Function/Work/Activity:  Independent and drives     Number of Personal Factors/Comorbidities that affect the Plan of Care: Brief history (0):  LOW COMPLEXITY   ASSESSMENT OF OCCUPATIONAL PERFORMANCE[de-identified]   Activities of Daily Living:   Basic ADLs (From Assessment) Complex ADLs (From Assessment)   Feeding: Independent  Oral Facial Hygiene/Grooming: Supervision  Bathing: Supervision  Upper Body Dressing: Supervision  Lower Body Dressing: Supervision  Toileting: Supervision     Grooming/Bathing/Dressing Activities of Daily Living     Cognitive Retraining  Safety/Judgement: Awareness of environment; Fall prevention                       Bed/Mat Mobility  Supine to Sit: Supervision  Sit to Supine: Supervision  Sit to Stand: Stand-by assistance  Stand to Sit: Stand-by assistance  Bed to Chair: Stand-by assistance  Scooting: Supervision     Most Recent Physical Functioning:   Gross Assessment:                  Posture:     Balance:  Sitting: Intact  Standing: Without support Bed Mobility:  Supine to Sit: Supervision  Sit to Supine: Supervision  Scooting: Supervision  Wheelchair Mobility:     Transfers:  Sit to Stand: Stand-by assistance  Stand to Sit: Stand-by assistance  Bed to Chair: Stand-by assistance            Patient Vitals for the past 6 hrs:   BP BP Patient Position SpO2 Pulse   09/01/21 0752 (!) 158/77 At rest 98 % 64   09/01/21 1146 (!) 171/90 -- 98 % 69       Mental Status  Neurologic State: Alert, Appropriate for age  Orientation Level: Appropriate for age  Cognition: Appropriate decision making, Appropriate for age attention/concentration, Follows commands  Perception: Appears intact  Perseveration: No perseveration noted  Safety/Judgement: Awareness of environment, Fall prevention                          Physical Skills Involved:  Balance  Activity Tolerance Cognitive Skills Affected (resulting in the inability to perform in a timely and safe manner):  none Psychosocial Skills Affected:  none   Number of elements that affect the Plan of Care: 1-3:  LOW COMPLEXITY   CLINICAL DECISION MAKIN[address] [place] [address] AM-PAC 6 Clicks   Daily Activity Inpatient Short Form  How much help from another person does the patient currently need. .. Total A Lot A Little None   1. Putting on and taking off regular lower body clothing? [] 1   [] 2   [x] 3   [] 4   2. Bathing (including washing, rinsing, drying)? [] 1   [] 2   [x] 3   [] 4   3. Toileting, which includes using toilet, bedpan or urinal?   [] 1   [] 2   [x] 3   [] 4   4. Putting on and taking off regular upper body clothing? [] 1   [] 2   [] 3   [x] 4   5. Taking care of personal grooming such as brushing teeth? [] 1   [] 2   [] 3   [x] 4   6. Eating meals? [] 1   [] 2   [] 3   [x] 4   © , Trustees of [address] [place] [address], under license to [employer]. All rights reserved      Score:  Initial: 21 Most Recent:  21 discharge 21    Interpretation of Tool:  Represents activities that are increasingly more difficult (i.e. Bed mobility, Transfers, Gait). Use of outcome tool(s) and clinical judgement create a POC that gives a: LOW COMPLEXITY         TREATMENT:   (In addition to Assessment/Re-Assessment sessions the following treatments were rendered)     Pre-treatment Symptoms/Complaints:    Pain: Initial:   Pain Intensity 1: 0  Post Session:  0/10     Self Care: (15): Procedure(s) (per grid) utilized to improve and/or restore self-care/home management as related to toileting, grooming, and functional mobility .  Required minimal visual and verbal cueing to facilitate activities of daily living skills and compensatory activities.     Assessment/Reassessment  complete    Braces/Orthotics/Lines/Etc:   IV  O2 Device: None (Room air)  Treatment/Session Assessment:    Response to Treatment:  tolerated well  Interdisciplinary Collaboration:   Physical Therapist  Occupational Therapist  Registered Nurse  Certified Nursing Assistant/Patient Care Technician  After treatment position/precautions:   Supine in bed  Bed/Chair-wheels locked  Bed in low position  Call light within reach  RN notified  Side rails x 3   Discharge OT  Total Treatment Duration:15  OT Patient Time In/Time Out  Time In: 1130  Time Out: [address]

## 2021-09-01 NOTE — PROGRESS NOTES
Physician Progress Note      PATIENT:               Marilyn Severe  CSN #:                  [id number]  :                       1945  ADMIT DATE:       2021 10:46 PM  DISCH DATE:  RESPONDING  PROVIDER #:        REBECCA CAI DO        QUERY TEXT:    Stage of Chronic Kidney Disease: Please provide further specificity, if known. Clinical indicators include:  Options provided:  -- Chronic kidney disease stage 1  -- Chronic kidney disease stage 2  -- Chronic kidney disease stage 3  -- Chronic kidney disease stage 3a  -- Chronic kidney disease stage 3b  -- Chronic kidney disease stage 4  -- Chronic kidney disease stage 5  -- Chronic kidney disease stage 5, requiring dialysis  -- End stage renal disease  -- Other - I will add my own diagnosis  -- Disagree - Not applicable / Not valid  -- Disagree - Clinically Unable to determine / Unknown        PROVIDER RESPONSE TEXT:    The patient has chronic kidney disease stage 2.       Electronically signed by:  Charissa Gillis DO 2021 2:17 PM

## 2021-09-02 VITALS
HEART RATE: 95 BPM | SYSTOLIC BLOOD PRESSURE: 156 MMHG | DIASTOLIC BLOOD PRESSURE: 83 MMHG | OXYGEN SATURATION: 94 % | TEMPERATURE: 97.8 F | RESPIRATION RATE: 20 BRPM

## 2021-09-02 LAB
ANION GAP SERPL CALC-SCNC: 4 MMOL/L (ref 7–16)
BUN SERPL-MCNC: 21 MG/DL (ref 8–23)
CALCIUM SERPL-MCNC: 8.1 MG/DL (ref 8.3–10.4)
CHLORIDE SERPL-SCNC: 117 MMOL/L (ref 98–107)
CO2 SERPL-SCNC: 23 MMOL/L (ref 21–32)
CREAT SERPL-MCNC: 0.99 MG/DL (ref 0.8–1.5)
ERYTHROCYTE [DISTWIDTH] IN BLOOD BY AUTOMATED COUNT: 15 % (ref 11.9–14.6)
GLUCOSE BLD STRIP.AUTO-MCNC: 106 MG/DL (ref 65–100)
GLUCOSE BLD STRIP.AUTO-MCNC: 170 MG/DL (ref 65–100)
GLUCOSE BLD STRIP.AUTO-MCNC: 175 MG/DL (ref 65–100)
GLUCOSE SERPL-MCNC: 113 MG/DL (ref 65–100)
HCT VFR BLD AUTO: 23.9 % (ref 41.1–50.3)
HGB BLD-MCNC: 7.7 G/DL (ref 13.6–17.2)
MCH RBC QN AUTO: 30.9 PG (ref 26.1–32.9)
MCHC RBC AUTO-ENTMCNC: 32.2 G/DL (ref 31.4–35)
MCV RBC AUTO: 96 FL (ref 79.6–97.8)
NRBC # BLD: 0 K/UL (ref 0–0.2)
PLATELET # BLD AUTO: 90 K/UL (ref 150–450)
PMV BLD AUTO: 12 FL (ref 9.4–12.3)
POTASSIUM SERPL-SCNC: 3.8 MMOL/L (ref 3.5–5.1)
RBC # BLD AUTO: 2.49 M/UL (ref 4.23–5.6)
SERVICE CMNT-IMP: ABNORMAL
SODIUM SERPL-SCNC: 144 MMOL/L (ref 136–145)
WBC # BLD AUTO: 5.4 K/UL (ref 4.3–11.1)

## 2021-09-02 PROCEDURE — 36415 COLL VENOUS BLD VENIPUNCTURE: CPT

## 2021-09-02 PROCEDURE — 74011636637 HC RX REV CODE- 636/637: Performed by: FAMILY MEDICINE

## 2021-09-02 PROCEDURE — 74011250637 HC RX REV CODE- 250/637: Performed by: FAMILY MEDICINE

## 2021-09-02 PROCEDURE — 80048 BASIC METABOLIC PNL TOTAL CA: CPT

## 2021-09-02 PROCEDURE — 74011250637 HC RX REV CODE- 250/637: Performed by: HOSPITALIST

## 2021-09-02 PROCEDURE — 82962 GLUCOSE BLOOD TEST: CPT

## 2021-09-02 PROCEDURE — 85027 COMPLETE CBC AUTOMATED: CPT

## 2021-09-02 RX ORDER — PANTOPRAZOLE SODIUM 40 MG/1
40 TABLET, DELAYED RELEASE ORAL
Status: DISCONTINUED | OUTPATIENT
Start: 2021-09-02 | End: 2021-09-02 | Stop reason: HOSPADM

## 2021-09-02 RX ORDER — LISINOPRIL 20 MG/1
20 TABLET ORAL DAILY
Status: DISCONTINUED | OUTPATIENT
Start: 2021-09-02 | End: 2021-09-02 | Stop reason: HOSPADM

## 2021-09-02 RX ORDER — GUAIFENESIN 100 MG/5ML
81 LIQUID (ML) ORAL DAILY
Qty: 30 TABLET | Refills: 0 | Status: SHIPPED
Start: 2021-09-02 | End: 2021-10-02

## 2021-09-02 RX ORDER — GUAIFENESIN 100 MG/5ML
81 LIQUID (ML) ORAL DAILY
Status: DISCONTINUED | OUTPATIENT
Start: 2021-09-02 | End: 2021-09-02 | Stop reason: HOSPADM

## 2021-09-02 RX ORDER — CARVEDILOL 6.25 MG/1
12.5 TABLET ORAL 2 TIMES DAILY WITH MEALS
Status: DISCONTINUED | OUTPATIENT
Start: 2021-09-02 | End: 2021-09-02 | Stop reason: HOSPADM

## 2021-09-02 RX ORDER — PANTOPRAZOLE SODIUM 40 MG/1
40 TABLET, DELAYED RELEASE ORAL
Qty: 60 TABLET | Refills: 0 | Status: SHIPPED | OUTPATIENT
Start: 2021-09-02 | End: 2021-10-02

## 2021-09-02 RX ADMIN — LEVOTHYROXINE SODIUM 88 MCG: 0.09 TABLET ORAL at 06:40

## 2021-09-02 RX ADMIN — INSULIN LISPRO 2 UNITS: 100 INJECTION, SOLUTION INTRAVENOUS; SUBCUTANEOUS at 12:24

## 2021-09-02 RX ADMIN — TICAGRELOR 90 MG: 90 TABLET ORAL at 09:28

## 2021-09-02 RX ADMIN — CARVEDILOL 12.5 MG: 6.25 TABLET, FILM COATED ORAL at 09:28

## 2021-09-02 RX ADMIN — ASPIRIN 81 MG: 81 TABLET, CHEWABLE ORAL at 09:28

## 2021-09-02 RX ADMIN — PANTOPRAZOLE SODIUM 40 MG: 40 TABLET, DELAYED RELEASE ORAL at 06:40

## 2021-09-02 RX ADMIN — LISINOPRIL 20 MG: 20 TABLET ORAL at 09:28

## 2021-09-02 NOTE — PROGRESS NOTES
Discharge instructions and education completed. Prescriptions reviewed with patient. Opportunity for questions and clarification provided. Patient verbalizes understanding. Patient discharged in stable condition to car via wheelchair.

## 2021-09-02 NOTE — PROGRESS NOTES
PT Note: Patient having hiccups and gagging which he says happens to him due to his esophageal issues. Declined PT at this time. Will try back later if time permits. Patient planning to discharge home today.

## 2021-09-02 NOTE — PROGRESS NOTES
Received patient awake, alert and oriented in bed. Patient has hiccups currently, states that they just happen at any time, denies further needs at this time. Nursing assessment completed. Bed in low and locked position. Call bell is within patient's reach.

## 2021-09-02 NOTE — PROGRESS NOTES
Pt in bed. Denies GI complaints at this time. Tolerating diet without difficulty. Bed locked, in lowest position, call light within reach.

## 2021-09-02 NOTE — DISCHARGE INSTRUCTIONS
Patient Education        Gastrointestinal Bleeding: Care Instructions  Your Care Instructions     The digestive or gastrointestinal tract goes from the mouth to the anus. It is often called the GI tract. Bleeding can happen anywhere in the GI tract. It may be caused by an ulcer, an infection, or cancer. It may also be caused by medicines such as aspirin or ibuprofen. Light bleeding may not cause any symptoms at first. But if you continue to bleed for a while, you may feel very weak or tired. Sudden, heavy bleeding means you need to see a doctor right away. This kind of bleeding can be very dangerous. But it can usually be cured or controlled. The doctor may do some tests to find the cause of your bleeding. Follow-up care is a key part of your treatment and safety. Be sure to make and go to all appointments, and call your doctor if you are having problems. It's also a good idea to know your test results and keep a list of the medicines you take. How can you care for yourself at home? · Be safe with medicines. Take your medicines exactly as prescribed. Call your doctor if you think you are having a problem with your medicine. You will get more details on the specific medicines your doctor prescribes. · Do not take aspirin or other anti-inflammatory medicines, such as naproxen (Aleve) or ibuprofen (Advil, Motrin), without talking to your doctor first. Ask your doctor if it is okay to use acetaminophen (Tylenol). · Do not drink alcohol. · The bleeding may make you lose iron. So it's important to eat foods that have a lot of iron. These include red meat, shellfish, poultry, and eggs. They also include beans, raisins, whole-grain breads, and leafy green vegetables. If you want help planning meals, you can make an appointment with a dietitian. When should you call for help? Call 911 anytime you think you may need emergency care.  For example, call if:    · You have sudden, severe belly pain.     · You vomit blood or what looks like coffee grounds.     · You passed out (lost consciousness).     · Your stools are maroon or very bloody. Call your doctor now or seek immediate medical care if:    · You are dizzy or lightheaded, or you feel like you may faint.     · Your stools are black and look like tar, or they have streaks of blood.     · You have belly pain.     · You vomit or have nausea.     · You have trouble swallowing, or it hurts when you swallow. Watch closely for changes in your health, and be sure to contact your doctor if:    · You do not get better as expected. Where can you learn more? Go to http://www.gray.com/  Enter F981 in the search box to learn more about \"Gastrointestinal Bleeding: Care Instructions. \"  Current as of: February 26, 2020               Content Version: 12.8  © 4696-9091 [employer]. Care instructions adapted under license by [employer] (which disclaims liability or warranty for this information). If you have questions about a medical condition or this instruction, always ask your healthcare professional. [address] any warranty or liability for your use of this information.

## 2022-10-13 NOTE — PROGRESS NOTES
Spiritual Care Visit. Initial visit. Patient was visited by [facility]. Entered by Honey Chawla, Staff Chaplain. Barbosa, Giancarlo 
 
 Cantharidin Pregnancy And Lactation Text: The use of this medication during pregnancy or lactation is not recommended as there is insufficient data.

## 2023-05-13 NOTE — DISCHARGE SUMMARY
Hospitalist Discharge Summary   Admit Date:  2021 10:46 PM   Name:  Dallas Young   Age:  76 y.o. Sex:  male  :  1945   MRN:  [id number]   PCP:  Olga Lidia Vazquez MD    Presenting Complaint: No chief complaint on file. Initial Admission Diagnosis: GI bleed [K92.2]     Problem List for this Hospitalization:  Hospital Problems as of 2021 Never Reviewed        Codes Class Noted - Resolved POA    * (Principal) GI bleed ICD-10-CM: K92.2  ICD-9-CM: 578.9  2021 - Present Unknown        Type 2 diabetes mellitus with diabetic neuropathy [facility] ICD-10-CM: E11.40  ICD-9-CM: 250.60, 357.2  2019 - Present         Hypertension ICD-10-CM: I10  ICD-9-CM: 401.9  2019 - Present Yes        Controlled type 2 diabetes mellitus without complication, without long-term current use of insulin (HCC) (Chronic) ICD-10-CM: E11.9  ICD-9-CM: 250.00  2019 - Present Yes        Esophageal dysmotility ICD-10-CM: K22.4  ICD-9-CM: 530.5  2019 - Present Yes            Did Patient have Sepsis (YES OR NO): NO    Acute upper GI bleed present on admission likely secondary to severe esophagitis POA  Coronary artery disease status post stenting in 2020  Diabetes mellitus type 2  Hypertension  Hypothyroidism      Hospital Course:    76 y. o. male with medical history of GERD, DM2, HTN, MARILIN, CAD on aspirin, Brilinta who presented to ED with report of vomiting blood.  Patient reports that this started yesterday. [facility] has experienced several episodes, and reports that earlier today, he noticed his stool was dark.  On initial ER presentation, patient was borderline hypotensive with BP of 94/68 which improved after IVFs.  Hgb 7.6, with baseline ~13.  Hemoccult positive.  Dr. Zulay Murillo of GI was consulted.  Hospitalist asked to admit.     Due to lack of beds at [facility] and stability of patient, case with discussed further between Dr. Zulay Murillo and  hospitalist, and decision was made to directly admit patient to [facility] for continued care.     Pt admitted for Acute GI bleed and GI was consulted. He underwent EGD on 8/31 that shows class D esophagitis, no active bleed. He is on PPI IV BID  subsequently switched to p.o. twice daily. Hemoglobin was 6.9 on 8/31 for which he received 2 units of packed red blood cells. Because of his coronary artery disease with recent stenting in October 2020, patient's aspirin and Brilinta were resumed. Hemoglobin stable at 7.7 on discharge. No further GI bleed noticed. Blood pressure was borderline low on admission and therefore his blood pressure medications were held. Other chronic medical problems which are stable include hypothyroidism, diabetes mellitus type 2. Patient is discharged  Home today in a stable condition to follow-up with primary care physician in 3 to 5 days. Disposition: Home Health Care Svc  Diet: ADULT DIET Easy to Chew  Code Status: Full Code    Follow Up Orders: Follow-up Appointments   Procedures    FOLLOW UP VISIT Appointment in: 3 - 5 Days F/U WITH PCP IN 3-5 DAYS F/U WITH GI IN 3-4 WEEKS     F/U WITH PCP IN 3-5 DAYS  F/U WITH GI IN 3-4 WEEKS     Standing Status:   Standing     Number of Occurrences:   1     Order Specific Question:   Appointment in     Answer:   3 - 5 Days       Follow-up Information     Follow up With Specialties Details Why Contact Denys Robledo MD Internal Medicine   [id number] [phone]            Time spent in patient discharge and coordination 38 minutes. Plan was discussed with the pt. All questions answered. Patient was stable at time of discharge. Instructions given to call a physician or return if any concerns. Discharge Info:   Current Discharge Medication List      START taking these medications    Details   aspirin 81 mg chewable tablet Take 1 Tablet by mouth daily for 30 days.   Qty: 30 Tablet, Refills: 0  Start date: 9/2/2021, End date: 10/2/2021 pantoprazole (PROTONIX) 40 mg tablet Take 1 Tablet by mouth Before breakfast and dinner for 30 days. Qty: 60 Tablet, Refills: 0  Start date: 9/2/2021, End date: 10/2/2021      ticagrelor (BRILINTA) 90 mg tablet Take 1 Tablet by mouth every twelve (12) hours every twelve (12) hours for 30 days. Qty: 60 Tablet, Refills: 0  Start date: 9/2/2021, End date: 10/2/2021         CONTINUE these medications which have NOT CHANGED    Details   diazePAM (VALIUM) 5 mg tablet Take 1 hour before MRI. If needed, may repeat dose. Qty: 2 Tab, Refills: 0    Associated Diagnoses: Claustrophobia      co-enzyme Q-10 (CO Q-10) 100 mg capsule Take 100 mg by mouth daily. OTHER Tumeric  10 ml once daily      testosterone cypionate (DEPOTESTOTERONE CYPIONATE) 200 mg/mL injection by IntraMUSCular route once. .6ml q 2 weeks      HYDROcodone-acetaminophen (NORCO) 7.5-325 mg per tablet Take  by mouth. Prn      cholecalciferol, vitamin D3, (VITAMIN D3 PO) Take  by mouth. 5,00O international      ascorbic acid (VITAMIN C PO) Take  by mouth.      vitamin E acetate (VITAMIN E PO) Take  by mouth.      gabapentin (NEURONTIN) 300 mg capsule Take 1 Cap by mouth three (3) times daily as needed for Pain. Qty: 270 Cap, Refills: 3      benazepril-hydroCHLOROthiazide (LOTENSIN HCT) 20-12.5 mg per tablet Take 1 Tab by mouth daily. Qty: 30 Tab, Refills: 0      metFORMIN (GLUCOPHAGE) 500 mg tablet Take 500 mg by mouth daily (with breakfast). carvedilol (COREG) 12.5 mg tablet Take  by mouth two (2) times daily (with meals). levothyroxine (SYNTHROID) 25 mcg tablet Take 88 mcg by mouth Daily (before breakfast). Procedures done this admission:  Procedure(s):  ESOPHAGOGASTRODUODENOSCOPY (EGD)  ESOPHAGOGASTRODUODENAL (EGD) BIOPSY    Consults this admission:  IP CONSULT TO GASTROENTEROLOGY    Echocardiogram/EKG results:  No results found for this visit on 08/30/21.     EKG Results     None          Diagnostic Imaging/Tests:   No results found. All Micro Results     Procedure Component Value Units Date/Time    COVID-19 RAPID TEST [[id number]] Collected: 08/31/21 1102    Order Status: Completed Specimen: Nasopharyngeal Updated: 08/31/21 1122     Specimen source Nasopharyngeal        COVID-19 rapid test Not detected        Comment:      The specimen is NEGATIVE for SARS-CoV-2, the novel coronavirus associated with COVID-19. A negative result does not rule out COVID-19. This test has been authorized by the FDA under an Emergency Use Authorization (EUA) for use by authorized laboratories. Fact sheet for Healthcare Providers: [employer]com[employer]nz  Fact sheet for Patients: [employer]       Methodology: Isothermal Nucleic Acid Amplification               Labs: Results:       BMP, Mg, Phos Recent Labs     09/02/21  0236 09/01/21  0415 08/31/21  0403    146* 144   K 3.8 3.8 4.0   * 119* 116*   CO2 23 24 23   AGAP 4* 3* 5*   BUN 21 42* 80*   CREA 0.99 1.18 1.48   CA 8.1* 7.6* 8.0*   * 96 145*      CBC Recent Labs     09/02/21  0236 09/01/21  1935 09/01/21  1356 09/01/21  0813 09/01/21  0415 08/31/21  0048 08/30/21  1820   WBC 5.4  --   --   --  6.0  --  9.3   RBC 2.49*  --   --   --  2.28*  --  2.56*   HGB 7.7* 7.7* 7.6*   < > 7.1*   < > 7.6*   HCT 23.9* 24.5* 23.3*   < > 21.5*   < > 24.9*   PLT 90*  --   --   --  84*  --  168   GRANS  --   --   --   --   --   --  79*   LYMPH  --   --   --   --   --   --  13   EOS  --   --   --   --   --   --  0*   MONOS  --   --   --   --   --   --  7   BASOS  --   --   --   --   --   --  0   IG  --   --   --   --   --   --  1   ANEU  --   --   --   --   --   --  7.3   ABL  --   --   --   --   --   --  1.2   ANY  --   --   --   --   --   --  0.0   ABM  --   --   --   --   --   --  0.6   ABB  --   --   --   --   --   --  0.0   AIG  --   --   --   --   --   --  0.1    < > = values in this interval not displayed.       LFT Recent Labs 08/30/21  1625   ALT 45      TP 5.6*   ALB 3.1*   GLOB 2.5   AGRAT 1.2      Cardiac Testing Lab Results   Component Value Date/Time     02/25/2019 09:59 PM    Troponin-I, Qt. <0.02 (L) 02/26/2019 09:54 AM    Troponin-I, Qt. <0.02 (L) 02/25/2019 09:59 PM      Coagulation Tests Lab Results   Component Value Date/Time    Prothrombin time 16.6 (H) 08/30/2021 04:25 PM    INR 1.3 08/30/2021 04:25 PM      A1c Lab Results   Component Value Date/Time    Hemoglobin A1c 5.9 09/01/2021 08:13 AM      Lipid Panel Lab Results   Component Value Date/Time    Cholesterol, total 134 02/26/2019 03:18 AM    HDL Cholesterol 40 02/26/2019 03:18 AM    LDL, calculated 69.4 02/26/2019 03:18 AM    VLDL, calculated 24.6 (H) 02/26/2019 03:18 AM    Triglyceride 123 02/26/2019 03:18 AM    CHOL/HDL Ratio 3.4 02/26/2019 03:18 AM      Thyroid Panel No results found for: TSH, T4, FT4, TT3, T3U, TSHEXT     Most Recent UA No results found for: COLOR, APPRN, REFSG, CARLTON, PROTU, GLUCU, KETU, BILU, BLDU, UROU, CHRIS, LEUKU, WBCU, RBCU, UEPI, BACTU, CASTS, UCRY, MUCUS, UCOM       All Labs from Last 24 Hrs:  Recent Results (from the past 24 hour(s))   GLUCOSE, POC    Collection Time: 09/01/21 12:13 PM   Result Value Ref Range    Glucose (POC) 175 (H) 65 - 100 mg/dL    Performed by [facility]    HGB & HCT    Collection Time: 09/01/21  1:56 PM   Result Value Ref Range    HGB 7.6 (L) 13.6 - 17.2 g/dL    HCT 23.3 (L) 41.1 - 50.3 %   GLUCOSE, POC    Collection Time: 09/01/21  4:38 PM   Result Value Ref Range    Glucose (POC) 103 (H) 65 - 100 mg/dL    Performed by [facility]    HGB & HCT    Collection Time: 09/01/21  7:35 PM   Result Value Ref Range    HGB 7.7 (L) 13.6 - 17.2 g/dL    HCT 24.5 (L) 41.1 - 50.3 %   GLUCOSE, POC    Collection Time: 09/01/21  8:43 PM   Result Value Ref Range    Glucose (POC) 124 (H) 65 - 100 mg/dL    Performed by [facility]    METABOLIC PANEL, BASIC    Collection Time: 09/02/21  2:36 AM   Result Value Ref Range Sodium 144 136 - 145 mmol/L    Potassium 3.8 3.5 - 5.1 mmol/L    Chloride 117 (H) 98 - 107 mmol/L    CO2 23 21 - 32 mmol/L    Anion gap 4 (L) 7 - 16 mmol/L    Glucose 113 (H) 65 - 100 mg/dL    BUN 21 8 - 23 MG/DL    Creatinine 0.99 0.8 - 1.5 MG/DL    GFR est AA >60 >60 ml/min/1.73m2    GFR est non-AA >60 >60 ml/min/1.73m2    Calcium 8.1 (L) 8.3 - 10.4 MG/DL   CBC W/O DIFF    Collection Time: 09/02/21  2:36 AM   Result Value Ref Range    WBC 5.4 4.3 - 11.1 K/uL    RBC 2.49 (L) 4.23 - 5.6 M/uL    HGB 7.7 (L) 13.6 - 17.2 g/dL    HCT 23.9 (L) 41.1 - 50.3 %    MCV 96.0 79.6 - 97.8 FL    MCH 30.9 26.1 - 32.9 PG    MCHC 32.2 31.4 - 35.0 g/dL    RDW 15.0 (H) 11.9 - 14.6 %    PLATELET 90 (L) 253 - 450 K/uL    MPV 12.0 9.4 - 12.3 FL    ABSOLUTE NRBC 0.00 0.0 - 0.2 K/uL   GLUCOSE, POC    Collection Time: 09/02/21  6:33 AM   Result Value Ref Range    Glucose (POC) 106 (H) 65 - 100 mg/dL    Performed by [facility] Bryan        Current Med List in Hospital:   Current Facility-Administered Medications   Medication Dose Route Frequency    aspirin chewable tablet 81 mg  81 mg Oral DAILY    ticagrelor (BRILINTA) tablet 90 mg  90 mg Oral Q12H    pantoprazole (PROTONIX) tablet 40 mg  40 mg Oral ACB&D    lisinopriL (PRINIVIL, ZESTRIL) tablet 20 mg  20 mg Oral DAILY    carvediloL (COREG) tablet 12.5 mg  12.5 mg Oral BID WITH MEALS    dextrose 40% (GLUTOSE) oral gel 1 Tube  15 g Oral PRN    glucagon (GLUCAGEN) injection 1 mg  1 mg IntraMUSCular PRN    dextrose (D50W) injection syrg 12.5-25 g  25-50 mL IntraVENous PRN    insulin lispro (HUMALOG) injection   SubCUTAneous AC&HS    0.9% sodium chloride infusion 250 mL  250 mL IntraVENous PRN    sodium chloride (NS) flush 5-40 mL  5-40 mL IntraVENous PRN    acetaminophen (TYLENOL) tablet 650 mg  650 mg Oral Q6H PRN    Or    acetaminophen (TYLENOL) suppository 650 mg  650 mg Rectal Q6H PRN    polyethylene glycol (MIRALAX) packet 17 g  17 g Oral DAILY PRN    ondansetron (ZOFRAN ODT) tablet 4 mg  4 mg Oral Q8H PRN    Or    ondansetron (ZOFRAN) injection 4 mg  4 mg IntraVENous Q6H PRN    levothyroxine (SYNTHROID) tablet 88 mcg  88 mcg Oral ACB       Allergies   Allergen Reactions    Baclofen Other (comments)     HALLUCINATIONS       There is no immunization history on file for this patient. Recent Vital Data:  Patient Vitals for the past 24 hrs:   Temp Pulse Resp BP SpO2   09/02/21 0723 98.3 °F (36.8 °C) (!) 56 20 (!) 170/80 97 %   09/02/21 0306 98.5 °F (36.9 °C) 68 16 (!) 156/76 97 %   09/01/21 2335 98.2 °F (36.8 °C) 75 17 (!) 141/74 100 %   09/01/21 1948 98.2 °F (36.8 °C) 65 17 (!) 149/74 98 %   09/01/21 1516 98.5 °F (36.9 °C) 66 17 (!) 141/75 98 %   09/01/21 1146 97.8 °F (36.6 °C) 69 17 (!) 171/90 98 %     Oxygen Therapy  O2 Sat (%): 97 % (09/02/21 0723)  O2 Device: None (Room air) (08/31/21 1230)  O2 Flow Rate (L/min): 3 l/min (08/31/21 1215)    Estimated body mass index is 25.94 kg/m² as calculated from the following:    Height as of an earlier encounter on 8/30/21: 5' 11\" (1.803 m). Weight as of an earlier encounter on 8/30/21: 84.4 kg (186 lb). No intake or output data in the 24 hours ending 09/02/21 0936      Physical Exam:    General:    Well nourished. No overt distress  Head:  Normocephalic, atraumatic  Eyes:  Sclerae appear normal.  Pupils equally round. HENT:  Nares appear normal, no drainage. Moist mucous membranes  Neck:  No restricted ROM. Trachea midline  CV:   RRR. No m/r/g. No JVD  Lungs:   CTAB. No wheezing, rhonchi, or rales. Even, unlabored  Abdomen:   Soft, nontender, nondistended. Extremities: Warm and dry. No cyanosis or clubbing. No edema. Skin:     No rashes. Normal coloration  Neuro:  Cranial nerves II-XII grossly intact. Psych:  Normal mood and affect. Signed:  Gabriel Gonzalez MD    Part of this note may have been written by using a voice dictation software.   The note has been proof read but may still contain some grammatical/other typographical errors. 124

## (undated) DEVICE — CONNECTOR TBNG OD5-7MM O2 END DISP

## (undated) DEVICE — FORCEPS BX L240CM JAW DIA2.8MM L CAP W/ NDL MIC MESH TOOTH

## (undated) DEVICE — CANNULA NSL ORAL AD FOR CAPNOFLEX CO2 O2 AIRLFE

## (undated) DEVICE — KENDALL RADIOLUCENT FOAM MONITORING ELECTRODE RECTANGULAR SHAPE: Brand: KENDALL

## (undated) DEVICE — BLOCK BITE AD 60FR W/ VELC STRP ADDRESSES MOST PT AND

## (undated) DEVICE — CONTAINER PREFIL FRMLN 40ML --